# Patient Record
Sex: FEMALE | Race: WHITE | Employment: PART TIME | URBAN - METROPOLITAN AREA
[De-identification: names, ages, dates, MRNs, and addresses within clinical notes are randomized per-mention and may not be internally consistent; named-entity substitution may affect disease eponyms.]

---

## 2017-02-17 DIAGNOSIS — M25.511 ACUTE PAIN OF RIGHT SHOULDER: Primary | ICD-10-CM

## 2017-03-29 ENCOUNTER — OFFICE VISIT (OUTPATIENT)
Dept: URGENT CARE | Facility: RETAIL CLINIC | Age: 42
End: 2017-03-29
Payer: COMMERCIAL

## 2017-03-29 VITALS
WEIGHT: 244.8 LBS | DIASTOLIC BLOOD PRESSURE: 86 MMHG | TEMPERATURE: 100.1 F | OXYGEN SATURATION: 99 % | BODY MASS INDEX: 46.25 KG/M2 | HEART RATE: 90 BPM | SYSTOLIC BLOOD PRESSURE: 135 MMHG

## 2017-03-29 DIAGNOSIS — R05.9 COUGH: Primary | ICD-10-CM

## 2017-03-29 PROCEDURE — 99212 OFFICE O/P EST SF 10 MIN: CPT | Performed by: INTERNAL MEDICINE

## 2017-03-29 RX ORDER — AZITHROMYCIN 250 MG/1
TABLET, FILM COATED ORAL
Qty: 6 TABLET | Refills: 0 | Status: SHIPPED | OUTPATIENT
Start: 2017-03-29 | End: 2017-05-05

## 2017-03-29 NOTE — MR AVS SNAPSHOT
"              After Visit Summary   3/29/2017    Taylor Rey    MRN: [id number]           Patient Information     Date Of Birth          1975        Visit Information        Provider Department      3/29/2017 3:20 PM Hansa Alejandra MD [facility]        Today's Diagnoses     Cough    -  1       Follow-ups after your visit        Who to contact     You can reach your care team any time of the day by calling [phone].  Notification of test results:  If you have an abnormal lab result, we will notify you by phone as soon as possible.         Additional Information About Your Visit        MyChart Information     Minilogs lets you send messages to your doctor, view your test results, renew your prescriptions, schedule appointments and more. To sign up, go to [email or web address] . Click on \"Log in\" on the left side of the screen, which will take you to the Welcome page. Then click on \"Sign up Now\" on the right side of the page.     You will be asked to enter the access code listed below, as well as some personal information. Please follow the directions to create your username and password.     Your access code is: [email or web address]  Expires: 2017  3:55 PM     Your access code will  in 90 days. If you need help or a new code, please call your [place] clinic or [phone].        Care EveryWhere ID     This is your [facility] EveryWhere ID. This could be used by other organizations to access your [place] medical records  [id number]        Your Vitals Were     Pulse Temperature Pulse Oximetry BMI (Body Mass Index)          90 100.1  F (37.8  C) (Tympanic) 99% 46.25 kg/m2         Blood Pressure from Last 3 Encounters:   17 135/86   16 128/88   16 139/76    Weight from Last 3 Encounters:   17 244 lb 12.8 oz (111 kg)   16 234 lb (106.1 kg)   16 232 lb (105.2 kg)              Today, you had the following     No orders found for display         Today's " Medication Changes          These changes are accurate as of: 3/29/17  3:55 PM.  If you have any questions, ask your nurse or doctor.               Start taking these medicines.        Dose/Directions    azithromycin 250 MG tablet   Commonly known as:  ZITHROMAX   Used for:  Cough   Started by:  Hansa Alejandra MD        Two tablets first day, then one tablet daily for four days.   Quantity:  6 tablet   Refills:  0            Where to get your medicines      These medications were sent to [place], [place] - [address]   [address] [facility] [address]     Phone:  [phone]     azithromycin 250 MG tablet                Primary Care Provider Office Phone # Fax #    Cydney Mccray PA-C [phone]       [address] [place] [address]        Thank you!     Thank you for choosing [facility]  for your care. Our goal is always to provide you with excellent care. Hearing back from our patients is one way we can continue to improve our services. Please take a few minutes to complete the written survey that you may receive in the mail after your visit with us. Thank you!             Your Updated Medication List - Protect others around you: Learn how to safely use, store and throw away your medicines at www.disposemymeds.org.          This list is accurate as of: 3/29/17  3:55 PM.  Always use your most recent med list.                   Brand Name Dispense Instructions for use    albuterol 108 (90 BASE) MCG/ACT Inhaler    albuterol    1 Inhaler    Inhale 2 puffs into the lungs every 4 hours as needed for shortness of breath / dyspnea       azithromycin 250 MG tablet    ZITHROMAX    6 tablet    Two tablets first day, then one tablet daily for four days.       fluticasone 50 MCG/ACT spray    FLONASE    16 g    Spray 1-2 sprays into both nostrils daily       losartan 25 MG tablet    COZAAR    30 tablet    Take 1 tablet (25 mg) by  mouth daily

## 2017-03-29 NOTE — NURSING NOTE
"Chief Complaint   Patient presents with     Cough     11 days. dry cough. not getting much sleep at night       Initial /86 (BP Location: Right arm, Patient Position: Chair, Cuff Size: Adult Large)  Pulse 90  Temp 100.1  F (37.8  C) (Tympanic)  Wt 244 lb 12.8 oz (111 kg)  SpO2 99%  BMI 46.25 kg/m2 Estimated body mass index is 46.25 kg/(m^2) as calculated from the following:    Height as of 7/18/16: 5' 1\" (1.549 m).    Weight as of this encounter: 244 lb 12.8 oz (111 kg).  Medication Reconciliation: complete   Annamarie Suresh CMA (AAMA)      "

## 2017-03-29 NOTE — PROGRESS NOTES
[facility] Care Progress Note        Hansa Alejandra MD, MPH  03/29/2017        History:      Taylor Rey is a pleasant 42 year old year old female with a chief complaint of a non-productive cough x 12 days  No dyspnea or chest pain.   No smoking history.   No headache or neck pain.  No GI or  symptoms.   No MSK symptoms.         Assessment and Plan:        Acute bronchitis:  - azithromycin (ZITHROMAX) 250 MG tablet; Two tablets first day, then one tablet daily for four days.  Dispense: 6 tablet; Refill: 0  Discussed supportive care with the patient  Advised to increase fluid intake and rest.  Tylenol for pain q 6 hours prn  F/u w PCP in 4-5 days, earlier if symptoms worsen.                   Physical Exam:      /86 (BP Location: Right arm, Patient Position: Chair, Cuff Size: Adult Large)  Pulse 90  Temp 100.1  F (37.8  C) (Tympanic)  Wt 244 lb 12.8 oz (111 kg)  SpO2 99%  BMI 46.25 kg/m2     Constitutional: Patient is in no distress The patient is pleasant and cooperative.   HEENT: Head:  Head is atraumatic, normocephalic.    Eyes: Pupils are equal, round and reactive to light and accomodation.  Sclera is non-icteric. No conjunctival injection, or exudate noted. Extraocular motion is intact. Visual acuity is intact bilaterally.  Ears:  External acoustic canals are patent and clear.  There is no erythema and bulging( exudate)  of the ( R/L ) tympanic membrane(s ).   Nose:  No Nasal congestion w/o drainage or mucosal ulceration is noted.  Throat:  Oral mucosa is moist.  No oral lesions are noted.  No posterior pharyngeal hyperemia nor exudate noted.     Neck Supple.  There is no cervical lymphadenopathy.  No nuchal rigidity noted.  There is no meningismus.     Cardiovascular: Heart is regular to rate and rhythm.  No murmur is noted.     Lungs: Clear in the anterior and posterior pulmonary fields. No respiratory distress.   Abdomen: Soft and non-tender.    Back No flank tenderness is  noted.   Extremeties No edema, no calf tenderness.   Neuro: No focal deficit.   Skin No petechiae or purpura is noted.  There is no rash.   Mood Normal              Data:      All new lab and imaging data was reviewed.   Results for orders placed or performed in visit on 07/18/16   PAP IMAGED THIN LAYER SCREEN   Result Value Ref Range    PAP NIL     Copath Report         Patient Name: LINN HAY  MR#: [id number]  Specimen #: [id number]  Collected: 7/18/2016  Received: 7/19/2016  Reported: 7/20/2016 13:52  Ordering Phy(s): MEMO LOO    SPECIMEN/STAIN PROCESS:  Pap imaged thin layer prep screening (Surepath, FocalPoint with guided  screening)       Pap-Cyto x 1, Reflex HPV if NIL/ASCUS/LSIL x 1    SOURCE: Cervical, endocervical  ----------------------------------------------------------------   Pap imaged thin layer prep screening (Surepath, FocalPoint with guided  screening)  SPECIMEN ADEQUACY:  Satisfactory for evaluation.  -Transformation zone component absent.    CYTOLOGIC INTERPRETATION:    Negative for Intraepithelial Lesion or Malignancy    Electronically signed out by:  LATOYA Hernandez (ASCP)    Processed and screened at [facility]    CLINICAL HISTORY:  LMP: 7/11/16    Papanicolaou Test Limitations:  Cervical cytology is a screening test  with limited sensitivity; regular screenin g is critical for cancer  prevention; Pap tests are primarily effective for the  diagnosis/prevention of squamous cell carcinoma, not adenocarcinomas or  other cancers.    TESTING LAB LOCATION:  [address]  [phone]    COLLECTION SITE:  Client:  [facility]  Location: [facility] (P)     Basic metabolic panel  (Ca, Cl, CO2, Creat, Gluc, K, Na, BUN)   Result Value Ref Range    Sodium 140 133 - 144 mmol/L    Potassium 3.9 3.4 - 5.3 mmol/L    Chloride 104 94 - 109 mmol/L    Carbon Dioxide 25  20 - 32 mmol/L    Anion Gap 11 3 - 14 mmol/L    Glucose 91 70 - 99 mg/dL    Urea Nitrogen 18 7 - 30 mg/dL    Creatinine 0.66 0.52 - 1.04 mg/dL    GFR Estimate >90  Non  GFR Calc   >60 mL/min/1.7m2    GFR Estimate If Black >90   GFR Calc   >60 mL/min/1.7m2    Calcium 8.7 8.5 - 10.1 mg/dL   TSH with free T4 reflex   Result Value Ref Range    TSH 2.11 0.40 - 4.00 mU/L   HPV High Risk Types DNA Cervical   Result Value Ref Range    HPV 16 DNA Negative NEG    HPV 18 DNA Negative NEG    Other HR HPV Negative NEG    Final Diagnosis       This patient's sample is negative for HPV DNA.   The American College of   Obstetricians and Gynecologists (ACOG) recommends any woman between 30-65 years   old who receives negative test results on both Pap cytology screening and HPV   DNA testing should be rescreened in 5 years.  (Note)  METHODOLOGY:  The Roche alexandro 4800 system uses automated extraction,  simultaneous amplification of HPV (L1 region) and beta-globin,  followed by  real time detection of fluorescent labeled HPV and beta  globin using specific oligonucleotide probes . The test specifically  identifies types HPV 16 DNA and HPV 18 DNA while concurrently  detecting the rest of the high risk types (31, 33, 35, 39, 45, 51,  52, 56, 58, 59, 66 or 68).    COMMENTS:  This test is not intended for use as a screening device  for women under age 30 with normal cervical cytology.  Results should  be correlated with cytologic and histologic findings. Close clinical  followup is recommended.    This test was developed and its perfo rmance characteristics  determined by the [facility]  [facility]. It has not been cleared or approved by the  FDA. The laboratory is regulated under CLIA as qualified to perform  high-complexity testing. This test is used for clinical purposes. It  should not be regarded as investigational or for research.        Specimen  Description Cervical Cells  [facility] [address]

## 2017-05-05 ENCOUNTER — OFFICE VISIT (OUTPATIENT)
Dept: FAMILY MEDICINE | Facility: OTHER | Age: 42
End: 2017-05-05
Payer: COMMERCIAL

## 2017-05-05 VITALS
BODY MASS INDEX: 45.69 KG/M2 | OXYGEN SATURATION: 98 % | DIASTOLIC BLOOD PRESSURE: 76 MMHG | HEART RATE: 105 BPM | RESPIRATION RATE: 20 BRPM | WEIGHT: 242 LBS | HEIGHT: 61 IN | SYSTOLIC BLOOD PRESSURE: 110 MMHG | TEMPERATURE: 98.6 F

## 2017-05-05 DIAGNOSIS — I10 BENIGN ESSENTIAL HYPERTENSION: ICD-10-CM

## 2017-05-05 DIAGNOSIS — E66.01 MORBID OBESITY, UNSPECIFIED OBESITY TYPE (H): Primary | ICD-10-CM

## 2017-05-05 PROCEDURE — 99213 OFFICE O/P EST LOW 20 MIN: CPT | Performed by: NURSE PRACTITIONER

## 2017-05-05 RX ORDER — LOSARTAN POTASSIUM 25 MG/1
25 TABLET ORAL DAILY
Qty: 90 TABLET | Refills: 3 | Status: SHIPPED | OUTPATIENT
Start: 2017-05-05 | End: 2018-06-20

## 2017-05-05 NOTE — NURSING NOTE
"Chief Complaint   Patient presents with     Recheck Medication     Losartan       Initial /76  Pulse 105  Temp 98.6  F (37  C) (Tympanic)  Resp 20  Ht 5' 1\" (1.549 m)  Wt 242 lb (109.8 kg)  LMP 04/29/2017  SpO2 98%  Breastfeeding? No  BMI 45.73 kg/m2 Estimated body mass index is 45.73 kg/(m^2) as calculated from the following:    Height as of this encounter: 5' 1\" (1.549 m).    Weight as of this encounter: 242 lb (109.8 kg).  Medication Reconciliation: complete   ................Hua Bowen LPN,   May 5, 2017,      2:31 PM,   [facility]    "

## 2017-05-05 NOTE — MR AVS SNAPSHOT
"              After Visit Summary   2017    Taylor Rey    MRN: [id number]           Patient Information     Date Of Birth          1975        Visit Information        Provider Department      2017 2:20 PM Ramya Dejesus APRN CNP [facility]        Today's Diagnoses     Benign essential hypertension          Care Instructions    Continue the Losartan as prescribed.     Follow up this summer for a physical and fasting labs.                 Follow-ups after your visit        Who to contact     If you have questions or need follow up information about today's clinic visit or your schedule please contact [facility] directly at [phone].  Normal or non-critical lab and imaging results will be communicated to you by Wattpadhart, letter or phone within 4 business days after the clinic has received the results. If you do not hear from us within 7 days, please contact the clinic through Wattpadhart or phone. If you have a critical or abnormal lab result, we will notify you by phone as soon as possible.  Submit refill requests through [employer] or call your pharmacy and they will forward the refill request to us. Please allow 3 business days for your refill to be completed.          Additional Information About Your Visit        MyChart Information     [employer] lets you send messages to your doctor, view your test results, renew your prescriptions, schedule appointments and more. To sign up, go to [email or web address] . Click on \"Log in\" on the left side of the screen, which will take you to the Welcome page. Then click on \"Sign up Now\" on the right side of the page.     You will be asked to enter the access code listed below, as well as some personal information. Please follow the directions to create your username and password.     Your access code is: [email or web address]  Expires: 2017  3:55 PM     Your access code will  in 90 days. If you need help or a new code, please call your " "[facility] or [phone].        Care EveryWhere ID     This is your Care EveryWhere ID. This could be used by other organizations to access your [place] medical records  [id number]        Your Vitals Were     Pulse Temperature Respirations Height Last Period Pulse Oximetry    105 98.6  F (37  C) (Tympanic) 20 5' 1\" (1.549 m) 04/29/2017 98%    Breastfeeding? BMI (Body Mass Index)                No 45.73 kg/m2           Blood Pressure from Last 3 Encounters:   05/05/17 110/76   03/29/17 135/86   12/22/16 128/88    Weight from Last 3 Encounters:   05/05/17 242 lb (109.8 kg)   03/29/17 244 lb 12.8 oz (111 kg)   07/18/16 234 lb (106.1 kg)              Today, you had the following     No orders found for display         Where to get your medicines      These medications were sent to [facility] [address]   [address] [facility] [address]     Phone:  [phone]     losartan 25 MG tablet          Primary Care Provider Office Phone # Fax #    Cydney Mccray PA-C [phone]       [address] [place] [address]        Thank you!     Thank you for choosing [facility]  for your care. Our goal is always to provide you with excellent care. Hearing back from our patients is one way we can continue to improve our services. Please take a few minutes to complete the written survey that you may receive in the mail after your visit with us. Thank you!             Your Updated Medication List - Protect others around you: Learn how to safely use, store and throw away your medicines at www.disposemymeds.org.          This list is accurate as of: 5/5/17  2:44 PM.  Always use your most recent med list.                   Brand Name Dispense Instructions for use    losartan 25 MG tablet    COZAAR    90 tablet    Take 1 tablet (25 mg) by mouth daily         "

## 2017-05-05 NOTE — PROGRESS NOTES
"  SUBJECTIVE:                                                    Taylor Rey is a 42 year old female who presents to clinic today for the following health issues:      Hypertension Follow-up      Outpatient blood pressures are being checked at [facility].  Results are NL.    Low Salt Diet: low salt       Amount of exercise or physical activity: 10,000 steps daily    Problems taking medications regularly: No    Medication side effects: none    Diet: regular (no restrictions)        Problem list and histories reviewed & adjusted, as indicated.  Additional history: none    Patient Active Problem List   Diagnosis     Elevated blood pressure     Benign essential hypertension     Morbid obesity (H)     Past Surgical History:   Procedure Laterality Date     C  DELIVERY ONLY  2/10/2004    , Low Cervical     C FULL ROUT OBSTE CARE, DELIV      pt. had toxemia/fetal distress related to amniotic fluid     C LIGATION,FALLOPIAN TUBE W/  2/10/2004     CHOLECYSTECTOMY, LAPOROSCOPIC  2006    Cholecystectomy, Laparoscopic     HC REMOVE TONSILS/ADENOIDS,12+ Y/O         Social History   Substance Use Topics     Smoking status: Former Smoker     Quit date: 2005     Smokeless tobacco: Never Used      Comment: one pack a month     Alcohol use 0.0 oz/week     0 Standard drinks or equivalent per week      Comment: very rare     Family History   Problem Relation Age of Onset     CEREBROVASCULAR DISEASE Mother       from stroke/clotting \"vit. C deficiency\"     Arthritis Father      HEART DISEASE Father      CEREBROVASCULAR DISEASE Maternal Grandmother           \"     DIABETES Paternal Grandmother      Breast Cancer Paternal Grandmother      Arthritis Paternal Grandmother          Current Outpatient Prescriptions   Medication Sig Dispense Refill     losartan (COZAAR) 25 MG tablet Take 1 tablet (25 mg) by mouth daily 90 tablet 3     [DISCONTINUED] losartan (COZAAR) 25 MG tablet Take 1 " "tablet (25 mg) by mouth daily 30 tablet 3     Allergies   Allergen Reactions     Lisinopril Cough     Sulfa Drugs      Azo [Phenazopyridine] Rash     BP Readings from Last 3 Encounters:   05/05/17 110/76   03/29/17 135/86   12/22/16 128/88    Wt Readings from Last 3 Encounters:   05/05/17 242 lb (109.8 kg)   03/29/17 244 lb 12.8 oz (111 kg)   07/18/16 234 lb (106.1 kg)                    Reviewed and updated as needed this visit by clinical staff  Tobacco  Allergies  Meds  Soc Hx      Reviewed and updated as needed this visit by Provider         ROS:  C: NEGATIVE for fever, chills, change in weight  E/M: NEGATIVE for ear, mouth and throat problems  R: NEGATIVE for significant cough or SOB  CV: NEGATIVE for chest pain, palpitations or peripheral edema    OBJECTIVE:                                                    /76  Pulse 105  Temp 98.6  F (37  C) (Tympanic)  Resp 20  Ht 5' 1\" (1.549 m)  Wt 242 lb (109.8 kg)  LMP 04/29/2017  SpO2 98%  Breastfeeding? No  BMI 45.73 kg/m2  Body mass index is 45.73 kg/(m^2).  GENERAL: alert, no distress and obese  NECK: no adenopathy, no asymmetry, masses, or scars and thyroid normal to palpation  RESP: lungs clear to auscultation - no rales, rhonchi or wheezes  CV: regular rate and rhythm, normal S1 S2, no S3 or S4, no murmur, click or rub, no peripheral edema and peripheral pulses strong  MS: no gross musculoskeletal defects noted, no edema    Diagnostic Test Results:  none      ASSESSMENT/PLAN:                                                    Hypertension; controlled   Associated with the following complications:    None   Plan:  No changes in the patient's current treatment plan  - losartan (COZAAR) 25 MG tablet; Take 1 tablet (25 mg) by mouth daily  Dispense: 90 tablet; Refill: 3    FUTURE APPOINTMENTS:       - Follow up in 3 months for physical with fasting labs.   See Patient Instructions    PAOLA Moe [facility]    "

## 2017-05-05 NOTE — PATIENT INSTRUCTIONS
Continue the Losartan as prescribed.     Follow up this summer for a physical and fasting labs.

## 2017-06-26 ENCOUNTER — OFFICE VISIT (OUTPATIENT)
Dept: FAMILY MEDICINE | Facility: OTHER | Age: 42
End: 2017-06-26
Payer: COMMERCIAL

## 2017-06-26 VITALS
DIASTOLIC BLOOD PRESSURE: 80 MMHG | HEART RATE: 108 BPM | RESPIRATION RATE: 20 BRPM | TEMPERATURE: 98.1 F | OXYGEN SATURATION: 98 % | SYSTOLIC BLOOD PRESSURE: 122 MMHG | BODY MASS INDEX: 46.29 KG/M2 | WEIGHT: 245 LBS

## 2017-06-26 DIAGNOSIS — B07.8 COMMON WART: ICD-10-CM

## 2017-06-26 DIAGNOSIS — M25.50 MULTIPLE JOINT PAIN: Primary | ICD-10-CM

## 2017-06-26 PROCEDURE — 86431 RHEUMATOID FACTOR QUANT: CPT | Performed by: NURSE PRACTITIONER

## 2017-06-26 PROCEDURE — 86618 LYME DISEASE ANTIBODY: CPT | Performed by: NURSE PRACTITIONER

## 2017-06-26 PROCEDURE — 86140 C-REACTIVE PROTEIN: CPT | Performed by: NURSE PRACTITIONER

## 2017-06-26 PROCEDURE — 86038 ANTINUCLEAR ANTIBODIES: CPT | Performed by: NURSE PRACTITIONER

## 2017-06-26 PROCEDURE — 99214 OFFICE O/P EST MOD 30 MIN: CPT | Mod: 25 | Performed by: NURSE PRACTITIONER

## 2017-06-26 PROCEDURE — 17110 DESTRUCTION B9 LES UP TO 14: CPT | Performed by: NURSE PRACTITIONER

## 2017-06-26 PROCEDURE — 36415 COLL VENOUS BLD VENIPUNCTURE: CPT | Performed by: NURSE PRACTITIONER

## 2017-06-26 NOTE — PATIENT INSTRUCTIONS
Labs will be done today.  I will call or send a letter with results within the next 1-2 weeks.      Return in 3-4 weeks to have another wart treatment if needed.

## 2017-06-26 NOTE — NURSING NOTE
"Chief Complaint   Patient presents with     Arthritis     wants testing       Initial /80  Pulse 108  Temp 98.1  F (36.7  C) (Tympanic)  Resp 20  Wt 245 lb (111.1 kg)  LMP 06/20/2017  SpO2 98%  Breastfeeding? No  BMI 46.29 kg/m2 Estimated body mass index is 46.29 kg/(m^2) as calculated from the following:    Height as of 5/5/17: 5' 1\" (1.549 m).    Weight as of this encounter: 245 lb (111.1 kg).  Medication Reconciliation: complete   ................Hua Bowen LPN,   June 26, 2017,      3:28 PM,   [facility]     "

## 2017-06-26 NOTE — MR AVS SNAPSHOT
"              After Visit Summary   6/26/2017    Taylor Rey    MRN: [id number]           Patient Information     Date Of Birth          1975        Visit Information        Provider Department      6/26/2017 3:20 PM Ramya Dejesus APRN CNP [facility]        Today's Diagnoses     Multiple joint pain    -  1      Care Instructions    Labs will be done today.  I will call or send a letter with results within the next 1-2 weeks.      Return in 3-4 weeks to have another wart treatment if needed.                   Follow-ups after your visit        Who to contact     If you have questions or need follow up information about today's clinic visit or your schedule please contact [facility] directly at [phone].  Normal or non-critical lab and imaging results will be communicated to you by MyChart, letter or phone within 4 business days after the clinic has received the results. If you do not hear from us within 7 days, please contact the clinic through [employer] or phone. If you have a critical or abnormal lab result, we will notify you by phone as soon as possible.  Submit refill requests through [employer] or call your pharmacy and they will forward the refill request to us. Please allow 3 business days for your refill to be completed.          Additional Information About Your Visit        MyChart Information     [employer] lets you send messages to your doctor, view your test results, renew your prescriptions, schedule appointments and more. To sign up, go to [email or web address] . Click on \"Log in\" on the left side of the screen, which will take you to the Welcome page. Then click on \"Sign up Now\" on the right side of the page.     You will be asked to enter the access code listed below, as well as some personal information. Please follow the directions to create your username and password.     Your access code is: [email or web address]  Expires: 6/27/2017  3:55 PM     Your access code will "  in 90 days. If you need help or a new code, please call your [place] clinic or [phone].        Care EveryWhere ID     This is your Care EveryWhere ID. This could be used by other organizations to access your [place] medical records  [id number]        Your Vitals Were     Pulse Temperature Respirations Last Period Pulse Oximetry Breastfeeding?    108 98.1  F (36.7  C) (Tympanic) 20 2017 98% No    BMI (Body Mass Index)                   46.29 kg/m2            Blood Pressure from Last 3 Encounters:   17 122/80   17 110/76   17 135/86    Weight from Last 3 Encounters:   17 245 lb (111.1 kg)   17 242 lb (109.8 kg)   17 244 lb 12.8 oz (111 kg)              We Performed the Following     Antinuclear antibody screen by EIA     CRP, inflammation     Lyme Disease Phoebe with reflex to WB Serum     Rheumatoid factor        Primary Care Provider Office Phone # Fax #    Cydney Mccray PA-C [phone]       [address] [place] [address]        Equal Access to Services     LAURIE PALACIOS AH: Hadii aad ku hadasho Soomaali, waaxda luqadaha, qaybta kaalmada adeegyada, waxay idiin hayaan sage hurleyaramartínez reynoso ah. So [facility] [phone].    ATENCIÓN: Si habla español, tiene a holbrook disposición servicios gratuitos de asistencia lingüística. Llame al [phone].    We comply with applicable federal civil rights laws and [place] laws. We do not discriminate on the basis of race, color, national origin, age, disability sex, sexual orientation or gender identity.            Thank you!     Thank you for choosing [facility]  for your care. Our goal is always to provide you with excellent care. Hearing back from our patients is one way we can continue to improve our services. Please take a few minutes to complete the written survey that you may receive in the mail after your visit with us. Thank you!             Your Updated  Medication List - Protect others around you: Learn how to safely use, store and throw away your medicines at www.disposemymeds.org.          This list is accurate as of: 6/26/17  3:48 PM.  Always use your most recent med list.                   Brand Name Dispense Instructions for use Diagnosis    losartan 25 MG tablet    COZAAR    90 tablet    Take 1 tablet (25 mg) by mouth daily    Benign essential hypertension

## 2017-06-26 NOTE — PROGRESS NOTES
"  SUBJECTIVE:                                                    Taylor Rey is a 42 year old female who presents to clinic today for the following health issues:      Musculoskeletal problem/pain      Duration: many years    Description  Location: right shoulder, left hip, ankles, knees    Intensity:  mild    Accompanying signs and symptoms: swelling    History  Previous similar problem: no   Previous evaluation:  none    Precipitating or alleviating factors:  Trauma or overuse: no   Aggravating factors include: standing    Therapies tried: aleve, brace on ankle, naproxen; helps just slightly      Also wants warts frozen today.  Has tried over the counter treatments and they did not improve.     Problem list and histories reviewed & adjusted, as indicated.  Additional history: none    Patient Active Problem List   Diagnosis     Elevated blood pressure     Benign essential hypertension     Morbid obesity (H)     Past Surgical History:   Procedure Laterality Date     C  DELIVERY ONLY  2/10/2004    , Low Cervical     C FULL ROUT OBSTE CARE, DELIV      pt. had toxemia/fetal distress related to amniotic fluid     C LIGATION,FALLOPIAN TUBE W/  2/10/2004     CHOLECYSTECTOMY, LAPOROSCOPIC  2006    Cholecystectomy, Laparoscopic     HC REMOVE TONSILS/ADENOIDS,12+ Y/O         Social History   Substance Use Topics     Smoking status: Former Smoker     Quit date: 2005     Smokeless tobacco: Never Used      Comment: one pack a month     Alcohol use 0.0 oz/week     0 Standard drinks or equivalent per week      Comment: very rare     Family History   Problem Relation Age of Onset     CEREBROVASCULAR DISEASE Mother       from stroke/clotting \"vit. C deficiency\"     Arthritis Father      HEART DISEASE Father      CEREBROVASCULAR DISEASE Maternal Grandmother           \"     DIABETES Paternal Grandmother      Breast Cancer Paternal Grandmother      Arthritis Paternal Grandmother     "      Current Outpatient Prescriptions   Medication Sig Dispense Refill     losartan (COZAAR) 25 MG tablet Take 1 tablet (25 mg) by mouth daily 90 tablet 3     Allergies   Allergen Reactions     Lisinopril Cough     Sulfa Drugs      Azo [Phenazopyridine] Rash     BP Readings from Last 3 Encounters:   06/26/17 122/80   05/05/17 110/76   03/29/17 135/86    Wt Readings from Last 3 Encounters:   06/26/17 245 lb (111.1 kg)   05/05/17 242 lb (109.8 kg)   03/29/17 244 lb 12.8 oz (111 kg)                    Reviewed and updated as needed this visit by clinical staff  Tobacco  Allergies  Meds  Med Hx  Surg Hx  Fam Hx  Soc Hx      Reviewed and updated as needed this visit by Provider         ROS:  C: NEGATIVE for fever, chills, change in weight  INTEGUMENTARY/SKIN: NEGATIVE for worrisome rashes, moles or lesions  E/M: NEGATIVE for ear, mouth and throat problems  R: NEGATIVE for significant cough or SOB  CV: NEGATIVE for chest pain, palpitations or peripheral edema  GI: NEGATIVE for nausea, abdominal pain, heartburn, or change in bowel habits  : normal menstrual cycles  MUSCULOSKELETAL: as above     OBJECTIVE:     /80  Pulse 108  Temp 98.1  F (36.7  C) (Tympanic)  Resp 20  Wt 245 lb (111.1 kg)  LMP 06/20/2017  SpO2 98%  Breastfeeding? No  BMI 46.29 kg/m2  Body mass index is 46.29 kg/(m^2).  GENERAL: alert, no distress and obese  NECK: no adenopathy, no asymmetry, masses, or scars and thyroid normal to palpation  RESP: lungs clear to auscultation - no rales, rhonchi or wheezes  CV: regular rate and rhythm, normal S1 S2, no S3 or S4, no murmur, click or rub, no peripheral edema and peripheral pulses strong  MS: no gross musculoskeletal defects noted, trace right ankle edema.  Mild tenderness to palpation there.  No other joint swelling, erythema, or tenderness.  Walking normally.   SKIN: no suspicious lesions or rashes.  Has two warts - one on left 3rd digit and on on right top of foot.     Diagnostic Test  Results:  Pending      PROCEDURE: CRYOTHERAPY:   Discussed treatment options for warts including OTC treatments, cryotherapy and surgical removal.  Patient elects cryotherapy.  All lesions are frozen with LN2 x 2 freeze/thaw cycles. Patient tolerated procedure well.       ASSESSMENT/PLAN:       1. Multiple joint pain  Will check labs.  She also has some random point tenderness in her upper back, may be fibromyalgia.  - Lyme Disease Phoebe with reflex to WB Serum  - Antinuclear antibody screen by EIA  - Rheumatoid factor  - CRP, inflammation    2. Common wart  Return in 3-4 weeks for another treatment as needed.   - DESTRUCT BENIGN LESION, UP TO 14    See Patient Instructions    PAOLA Moe CNP  [facility]

## 2017-06-27 LAB — CRP SERPL-MCNC: 5.3 MG/L (ref 0–8)

## 2017-06-28 LAB
ANA SER QL IA: NORMAL
B BURGDOR IGG+IGM SER QL: 0.01 (ref 0–0.89)
RHEUMATOID FACT SER NEPH-ACNC: <20 IU/ML (ref 0–20)

## 2017-06-29 DIAGNOSIS — M25.50 MULTIPLE JOINT PAIN: Primary | ICD-10-CM

## 2017-06-29 RX ORDER — CELECOXIB 100 MG/1
100 CAPSULE ORAL 2 TIMES DAILY PRN
Qty: 60 CAPSULE | Refills: 1 | Status: SHIPPED | OUTPATIENT
Start: 2017-06-29 | End: 2018-06-20

## 2017-06-30 ENCOUNTER — TELEPHONE (OUTPATIENT)
Dept: FAMILY MEDICINE | Facility: CLINIC | Age: 42
End: 2017-06-30

## 2017-06-30 NOTE — TELEPHONE ENCOUNTER
Reason for call:  Patient reporting a symptom    Symptom or request: possible infected wart    Duration (how long have symptoms been present): today    Have you been treated for this before? No    Additional comments: Taylor had a wart frozen off on 6-26-17, she thinks it might be infected and would like to speak to a nurse to find out if she should be seen    Phone Number patient can be reached at:  Home number on file [phone] (home)    Best Time:      Can we leave a detailed message on this number:  YES    Call taken on 6/30/2017 at 3:24 PM by Renetta Dacosta

## 2017-07-01 ENCOUNTER — OFFICE VISIT (OUTPATIENT)
Dept: URGENT CARE | Facility: RETAIL CLINIC | Age: 42
End: 2017-07-01
Payer: COMMERCIAL

## 2017-07-01 VITALS
HEART RATE: 79 BPM | DIASTOLIC BLOOD PRESSURE: 78 MMHG | BODY MASS INDEX: 46.29 KG/M2 | TEMPERATURE: 98.5 F | RESPIRATION RATE: 18 BRPM | SYSTOLIC BLOOD PRESSURE: 117 MMHG | OXYGEN SATURATION: 100 % | WEIGHT: 245 LBS

## 2017-07-01 DIAGNOSIS — R21 RASH: Primary | ICD-10-CM

## 2017-07-01 PROCEDURE — 99213 OFFICE O/P EST LOW 20 MIN: CPT | Performed by: INTERNAL MEDICINE

## 2017-07-01 RX ORDER — DOXYCYCLINE 100 MG/1
100 CAPSULE ORAL 2 TIMES DAILY
Qty: 20 CAPSULE | Refills: 0 | Status: SHIPPED | OUTPATIENT
Start: 2017-07-01 | End: 2018-01-27

## 2017-07-01 RX ORDER — MUPIROCIN CALCIUM 20 MG/G
CREAM TOPICAL 3 TIMES DAILY
Qty: 22 G | Refills: 0 | Status: SHIPPED | OUTPATIENT
Start: 2017-07-01 | End: 2018-06-20

## 2017-07-01 NOTE — MR AVS SNAPSHOT
"              After Visit Summary   2017    Taylor Rey    MRN: [id number]           Patient Information     Date Of Birth          1975        Visit Information        Provider Department      2017 11:00 AM Hansa Alejandra MD [facility]        Today's Diagnoses     Rash    -  1       Follow-ups after your visit        Who to contact     You can reach your care team any time of the day by calling [phone].  Notification of test results:  If you have an abnormal lab result, we will notify you by phone as soon as possible.         Additional Information About Your Visit        MyChart Information     CloudCar lets you send messages to your doctor, view your test results, renew your prescriptions, schedule appointments and more. To sign up, go to [email or web address] . Click on \"Log in\" on the left side of the screen, which will take you to the Welcome page. Then click on \"Sign up Now\" on the right side of the page.     You will be asked to enter the access code listed below, as well as some personal information. Please follow the directions to create your username and password.     Your access code is: [email or web address]  Expires: 2017 11:45 AM     Your access code will  in 90 days. If you need help or a new code, please call your [place] clinic or [phone].        Care EveryWhere ID     This is your Care EveryWhere ID. This could be used by other organizations to access your [place] medical records  [id number]        Your Vitals Were     Pulse Temperature Respirations Last Period Pulse Oximetry BMI (Body Mass Index)    79 98.5  F (36.9  C) (Tympanic) 18 2017 100% 46.29 kg/m2       Blood Pressure from Last 3 Encounters:   17 117/78   17 122/80   17 110/76    Weight from Last 3 Encounters:   17 245 lb (111.1 kg)   17 245 lb (111.1 kg)   17 242 lb (109.8 kg)              Today, you had the following     No orders found for " display         Today's Medication Changes          These changes are accurate as of: 7/1/17 11:45 AM.  If you have any questions, ask your nurse or doctor.               Start taking these medicines.        Dose/Directions    doxycycline 100 MG capsule   Commonly known as:  VIBRAMYCIN   Used for:  Rash   Started by:  Hansa Alejandra MD        Dose:  100 mg   Take 1 capsule (100 mg) by mouth 2 times daily   Quantity:  20 capsule   Refills:  0       mupirocin 2 % cream   Commonly known as:  BACTROBAN   Used for:  Rash   Started by:  Hansa Alejandra MD        Apply topically 3 times daily   Quantity:  22 g   Refills:  0            Where to get your medicines      These medications were sent to [facility] [place], [place] - [address]  [address] [facility] [address]     Phone:  [phone]     doxycycline 100 MG capsule    mupirocin 2 % cream                Primary Care Provider Office Phone # Fax #    Cydney MILKA Mccray PA-C [phone]       [address] [place] [address]        Equal Access to Services     [facility]: Hadii aad ku hadasho Soomaali, waaxda luqadaha, qaybta kaalmada adeegyada, waxay idiin hayaan sage khifeanyi reynoso . So [facility] [phone].    ATENCIÓN: Si habla español, tiene a holbrook disposición servicios gratuitos de asistencia lingüística. Llame al [phone].    We comply with applicable federal civil rights laws and [place] laws. We do not discriminate on the basis of race, color, national origin, age, disability sex, sexual orientation or gender identity.            Thank you!     Thank you for choosing [facility]  for your care. Our goal is always to provide you with excellent care. Hearing back from our patients is one way we can continue to improve our services. Please take a few minutes to complete the written survey that you may receive in the mail after your visit with us. Thank you!              Your Updated Medication List - Protect others around you: Learn how to safely use, store and throw away your medicines at www.disposemymeds.org.          This list is accurate as of: 7/1/17 11:45 AM.  Always use your most recent med list.                   Brand Name Dispense Instructions for use Diagnosis    celecoxib 100 MG capsule    celeBREX    60 capsule    Take 1 capsule (100 mg) by mouth 2 times daily as needed for moderate pain    Multiple joint pain       doxycycline 100 MG capsule    VIBRAMYCIN    20 capsule    Take 1 capsule (100 mg) by mouth 2 times daily    Rash       losartan 25 MG tablet    COZAAR    90 tablet    Take 1 tablet (25 mg) by mouth daily    Benign essential hypertension       mupirocin 2 % cream    BACTROBAN    22 g    Apply topically 3 times daily    Rash

## 2017-07-01 NOTE — NURSING NOTE
"Chief Complaint   Patient presents with     Derm Problem     Patient had wart froze of on right foot and left index finger on monday area is now swollen and painful       Initial /78 (BP Location: Right arm, Patient Position: Chair, Cuff Size: Adult Large)  Pulse 79  Temp 98.5  F (36.9  C) (Tympanic)  Resp 18  Wt 245 lb (111.1 kg)  LMP 06/20/2017  SpO2 100%  BMI 46.29 kg/m2 Estimated body mass index is 46.29 kg/(m^2) as calculated from the following:    Height as of 5/5/17: 5' 1\" (1.549 m).    Weight as of this encounter: 245 lb (111.1 kg).  Medication Reconciliation: complete     Jessica Sundet      "

## 2017-07-01 NOTE — PROGRESS NOTES
[facility] Care Progress Note        Hansa Alejandra MD, MPH  07/01/2017    Taylor Rey is a pleasant 42 year old female seen for a rash surrounding right dorsal foot where she had wart treatment 5 days ago. No calf tenderness is referred. No trauma. No drainage from the area of cryotherapy is referred. No fever or chills. .  There has not been any change in the diet or new detergent, soap or toiletries.  No new medications, no insect bite.  No fever or chills and no recent illness.  No cough or shortness of breath or wheezing is referred.  No nausea, vomiting or diarrhea.  No arthralgia or myalgia.  No tongue or lip swelling or swallowing    Physical Exam   /78 (BP Location: Right arm, Patient Position: Chair, Cuff Size: Adult Large)  Pulse 79  Temp 98.5  F (36.9  C) (Tympanic)  Resp 18  Wt 245 lb (111.1 kg)  LMP 06/20/2017  SpO2 100%  BMI 46.29 kg/m2     Constitutional: Patient is in no distress The patient is pleasant and cooperative.   HEENT: Head:  Head is atraumatic, normocephalic.    Eyes: Pupils are equal, round and reactive to light and accomodation.  Sclera is non-icteric. No conjunctival injection, or exudate noted. Extraocular motion is intact. Visual acuity is intact bilaterally.  Ears:  External acoustic canals are patent and clear.  There is no erythema and bulging( exudate)  of the ( R/L ) tympanic membrane(s ).   Nose:  No Nasal congestion w/o drainage or mucosal ulceration is noted.  Throat:  Oral mucosa is moist.  No oral lesions are noted.  No posterior pharyngeal hyperemia or exudate noted.     Neck Supple.  There is no cervical lymphadenopathy.  No nuchal rigidity noted.  There is no meningismus.     Cardiovascular: Heart is regular to rate and rhythm.  No murmur is noted.     Chest. Chest Symmetrical, no soft tissues, swelling, or tenderness upon palpation   Lungs: Clear in the anterior and posterior pulmonary fields.   Abdomen: Soft and non-tender.    Back No  flank tenderness is noted.   Extremeties No edema, no calf tenderness.   Neuro: No focal deficit.   Skin No petechiae or purpura is noted.  There is a circumferential area of erythema surrounding dorsal right foot area of wart cryotherapy w/o drainage. A superficial skin ulcer at the site of wart treatment is noted. No fluctuant mass or abscess is noted. No lymphangitis.   Mood Normal         Assessment & Plan     S/p cryotherapy of right dorsal foot with subsequent cellulitis:    - mupirocin (BACTROBAN) 2 % cream; Apply topically 3 times daily  Dispense: 22 g; Refill: 0  - doxycycline (VIBRAMYCIN) 100 MG capsule; Take 1 capsule (100 mg) by mouth 2 times daily  Dispense: 20 capsule; Refill: 0   Discussed the adverse effects of the medication. S/p hysterectomy.  Follow-up with your PCP in 2 days, earlier if symptoms worsen.  Advisd to go to ER if symptoms worsen.  Advised to maintain right foot clean by washing w soap and water and keep the area of wart treated skin covered with moist gauze pads.  Advised to keep right foot elevted.

## 2017-07-03 ENCOUNTER — OFFICE VISIT (OUTPATIENT)
Dept: FAMILY MEDICINE | Facility: OTHER | Age: 42
End: 2017-07-03
Payer: COMMERCIAL

## 2017-07-03 VITALS
TEMPERATURE: 97.2 F | SYSTOLIC BLOOD PRESSURE: 122 MMHG | WEIGHT: 245.7 LBS | HEART RATE: 72 BPM | DIASTOLIC BLOOD PRESSURE: 74 MMHG | RESPIRATION RATE: 20 BRPM | BODY MASS INDEX: 46.42 KG/M2

## 2017-07-03 DIAGNOSIS — L03.115 CELLULITIS OF RIGHT LOWER EXTREMITY: Primary | ICD-10-CM

## 2017-07-03 DIAGNOSIS — E66.01 MORBID OBESITY WITH BMI OF 45.0-49.9, ADULT (H): ICD-10-CM

## 2017-07-03 PROCEDURE — 99213 OFFICE O/P EST LOW 20 MIN: CPT | Performed by: PHYSICIAN ASSISTANT

## 2017-07-03 ASSESSMENT — PAIN SCALES - GENERAL: PAINLEVEL: MODERATE PAIN (4)

## 2017-07-03 NOTE — TELEPHONE ENCOUNTER
RN attempted to all pt to discuss her wart concern, but NA. Left message on identified phone to please call us back if she still has concerns. ...........RIA Guillory

## 2017-07-03 NOTE — MR AVS SNAPSHOT
"              After Visit Summary   7/3/2017    Taylor Rey    MRN: [id number]           Patient Information     Date Of Birth          1975        Visit Information        Provider Department      7/3/2017 4:20 PM Hilton Farris PA-C [facility]        Today's Diagnoses     Cellulitis of right lower extremity    -  1       Follow-ups after your visit        Who to contact     If you have questions or need follow up information about today's clinic visit or your schedule please contact [facility] directly at [phone].  Normal or non-critical lab and imaging results will be communicated to you by Fanaticallhart, letter or phone within 4 business days after the clinic has received the results. If you do not hear from us within 7 days, please contact the clinic through Fanaticallhart or phone. If you have a critical or abnormal lab result, we will notify you by phone as soon as possible.  Submit refill requests through Tractive or call your pharmacy and they will forward the refill request to us. Please allow 3 business days for your refill to be completed.          Additional Information About Your Visit        MyChart Information     Tractive lets you send messages to your doctor, view your test results, renew your prescriptions, schedule appointments and more. To sign up, go to [email or web address] . Click on \"Log in\" on the left side of the screen, which will take you to the Welcome page. Then click on \"Sign up Now\" on the right side of the page.     You will be asked to enter the access code listed below, as well as some personal information. Please follow the directions to create your username and password.     Your access code is: [email or web address]  Expires: 2017 11:45 AM     Your access code will  in 90 days. If you need help or a new code, please call your [facility] or [phone].        Care EveryWhere ID     This is your Care EveryWhere ID. This could be used by other " organizations to access your [place] medical records  [id number]        Your Vitals Were     Pulse Temperature Respirations Last Period BMI (Body Mass Index)       72 97.2  F (36.2  C) (Oral) 20 06/20/2017 46.42 kg/m2        Blood Pressure from Last 3 Encounters:   07/03/17 122/74   07/01/17 117/78   06/26/17 122/80    Weight from Last 3 Encounters:   07/03/17 245 lb 11.2 oz (111.4 kg)   07/01/17 245 lb (111.1 kg)   06/26/17 245 lb (111.1 kg)              Today, you had the following     No orders found for display       Primary Care Provider Office Phone # Fax #    Cydney Mccray PA-C [phone]       [address] [place] [address]        Equal Access to Services     LAURIE PALACIOS : Hadii aad ku hadasho Soomaali, waaxda luqadaha, qaybta kaalmada adeegyada, kleber reinoso haygenna reynoso . So [facility] [phone].    ATENCIÓN: Si habla español, tiene a holbrook disposición servicios gratuitos de asistencia lingüística. Yonataname al [phone].    We comply with applicable federal civil rights laws and [place] laws. We do not discriminate on the basis of race, color, national origin, age, disability sex, sexual orientation or gender identity.            Thank you!     Thank you for choosing [facility]  for your care. Our goal is always to provide you with excellent care. Hearing back from our patients is one way we can continue to improve our services. Please take a few minutes to complete the written survey that you may receive in the mail after your visit with us. Thank you!             Your Updated Medication List - Protect others around you: Learn how to safely use, store and throw away your medicines at www.disposemymeds.org.          This list is accurate as of: 7/3/17  4:25 PM.  Always use your most recent med list.                   Brand Name Dispense Instructions for use Diagnosis    celecoxib 100 MG capsule    celeBREX    60 capsule    Take  1 capsule (100 mg) by mouth 2 times daily as needed for moderate pain    Multiple joint pain       doxycycline 100 MG capsule    VIBRAMYCIN    20 capsule    Take 1 capsule (100 mg) by mouth 2 times daily    Rash       losartan 25 MG tablet    COZAAR    90 tablet    Take 1 tablet (25 mg) by mouth daily    Benign essential hypertension       mupirocin 2 % cream    BACTROBAN    22 g    Apply topically 3 times daily    Rash

## 2017-07-03 NOTE — NURSING NOTE
"Chief Complaint   Patient presents with     Wart     recheck warts after treatment       Initial /74 (BP Location: Right arm, Patient Position: Chair, Cuff Size: Adult Large)  Pulse 72  Temp 97.2  F (36.2  C) (Oral)  Resp 20  Wt 245 lb 11.2 oz (111.4 kg)  LMP 06/20/2017  BMI 46.42 kg/m2 Estimated body mass index is 46.42 kg/(m^2) as calculated from the following:    Height as of 5/5/17: 5' 1\" (1.549 m).    Weight as of this encounter: 245 lb 11.2 oz (111.4 kg).  Medication Reconciliation: complete       Thuy AGUILAR LPN    Health Maintenance reviewed at today's visit patient asked to schedule/complete:   Immunizations:  Patient declined    "

## 2017-07-03 NOTE — PROGRESS NOTES
"  SUBJECTIVE:                                                    Taylor Rey is a 42 year old female who presents to clinic today for the following health issues:      Concern - recheck after wart treatment  Onset: recheck    Description:   Cellulitis to the right foot which is resolving well.    Intensity: mild    Progression of Symptoms:  improving    Accompanying Signs & Symptoms:  Mild pain    Previous history of similar problem:   denies    Precipitating factors:   Worsened by: movement    Alleviating factors:  Improved by: medications.    Therapies Tried and outcome:   Problem list and histories reviewed & adjusted, as indicated.  Additional history: as documented    Patient Active Problem List   Diagnosis     Elevated blood pressure     Benign essential hypertension     Morbid obesity (H)     Past Surgical History:   Procedure Laterality Date     C  DELIVERY ONLY  2/10/2004    , Low Cervical     C FULL ROUT OBSTE CARE, DELIV      pt. had toxemia/fetal distress related to amniotic fluid     C LIGATION,FALLOPIAN TUBE W/  2/10/2004     CHOLECYSTECTOMY, LAPOROSCOPIC  2006    Cholecystectomy, Laparoscopic     HC REMOVE TONSILS/ADENOIDS,12+ Y/O         Social History   Substance Use Topics     Smoking status: Former Smoker     Quit date: 2005     Smokeless tobacco: Never Used      Comment: one pack a month     Alcohol use 0.0 oz/week     0 Standard drinks or equivalent per week      Comment: very rare     Family History   Problem Relation Age of Onset     CEREBROVASCULAR DISEASE Mother       from stroke/clotting \"vit. C deficiency\"     Arthritis Father      HEART DISEASE Father      CEREBROVASCULAR DISEASE Maternal Grandmother           \"     DIABETES Paternal Grandmother      Breast Cancer Paternal Grandmother      Arthritis Paternal Grandmother            Reviewed and updated as needed this visit by clinical staff  Tobacco  Allergies  Med Hx  Surg Hx  Fam " "Hx  Soc Hx      Reviewed and updated as needed this visit by Provider         ROS:  Constitutional, HEENT, cardiovascular, pulmonary, gi and gu systems are negative, except as otherwise noted.    OBJECTIVE:     /74 (BP Location: Right arm, Patient Position: Chair, Cuff Size: Adult Large)  Pulse 72  Temp 97.2  F (36.2  C) (Oral)  Resp 20  Wt 245 lb 11.2 oz (111.4 kg)  LMP 06/20/2017  BMI 46.42 kg/m2  Body mass index is 46.42 kg/(m^2).  GENERAL: healthy, alert and no distress  NECK: no adenopathy, no asymmetry, masses, or scars and thyroid normal to palpation  RESP: lungs clear to auscultation - no rales, rhonchi or wheezes  CV: regular rate and rhythm, normal S1 S2, no S3 or S4, no murmur, click or rub, no peripheral edema and peripheral pulses strong  ABDOMEN: soft, nontender, no hepatosplenomegaly, no masses and bowel sounds normal  MS: no gross musculoskeletal defects noted, no edema    Diagnostic Test Results:  No results found for this or any previous visit (from the past 24 hour(s)).    ASSESSMENT/PLAN:     BMI:   Estimated body mass index is 46.42 kg/(m^2) as calculated from the following:    Height as of 5/5/17: 5' 1\" (1.549 m).    Weight as of this encounter: 245 lb 11.2 oz (111.4 kg).   Weight management plan: Patient was referred to their PCP to discuss a diet and exercise plan.      1. Cellulitis of right lower extremity  Doing well on current medications.  Continue same.  Hilton Bryan PA-C  [facility]    "

## 2018-01-27 ENCOUNTER — OFFICE VISIT (OUTPATIENT)
Dept: URGENT CARE | Facility: RETAIL CLINIC | Age: 43
End: 2018-01-27
Payer: COMMERCIAL

## 2018-01-27 VITALS
SYSTOLIC BLOOD PRESSURE: 123 MMHG | TEMPERATURE: 99.7 F | HEART RATE: 95 BPM | DIASTOLIC BLOOD PRESSURE: 69 MMHG | OXYGEN SATURATION: 97 %

## 2018-01-27 DIAGNOSIS — R80.9 PROTEINURIA, UNSPECIFIED TYPE: ICD-10-CM

## 2018-01-27 DIAGNOSIS — R30.0 DYSURIA: Primary | ICD-10-CM

## 2018-01-27 DIAGNOSIS — N30.01 ACUTE CYSTITIS WITH HEMATURIA: ICD-10-CM

## 2018-01-27 LAB
APPEARANCE UR: NORMAL
BILIRUB UR QL: NORMAL
COLOR UR: NORMAL
GLUCOSE URINE: NORMAL MG/DL
HGB UR QL: NORMAL
KETONES UR QL: 5 MG/DL
LEUKOCYTE ESTERASE URINE: NORMAL
NITRITE UR QL STRIP: NORMAL
PH UR STRIP: 5.5 PH (ref 5–7)
PROTEIN ALBUMIN URINE: 300 MG/DL
SOURCE: NORMAL
SP GR UR STRIP: 1.03 (ref 1–1.03)
UROBILINOGEN UR QL STRIP: 0.2 EU/DL (ref 0.2–1)

## 2018-01-27 PROCEDURE — 81002 URINALYSIS NONAUTO W/O SCOPE: CPT | Mod: QW | Performed by: PHYSICIAN ASSISTANT

## 2018-01-27 PROCEDURE — 99213 OFFICE O/P EST LOW 20 MIN: CPT | Performed by: PHYSICIAN ASSISTANT

## 2018-01-27 PROCEDURE — 87086 URINE CULTURE/COLONY COUNT: CPT | Performed by: PHYSICIAN ASSISTANT

## 2018-01-27 RX ORDER — CIPROFLOXACIN 250 MG/1
250 TABLET, FILM COATED ORAL 2 TIMES DAILY
Qty: 6 TABLET | Refills: 0 | Status: SHIPPED | OUTPATIENT
Start: 2018-01-27 | End: 2018-01-29

## 2018-01-27 NOTE — NURSING NOTE
"Chief Complaint   Patient presents with     Dysuria     dysuria and frequency since this am - states has not had anything to drink today        Initial /69 (BP Location: Right arm, Patient Position: Chair, Cuff Size: Adult Large)  Pulse 95  Temp 99.7  F (37.6  C) (Tympanic)  SpO2 97% Estimated body mass index is 46.42 kg/(m^2) as calculated from the following:    Height as of 5/5/17: 5' 1\" (1.549 m).    Weight as of 7/3/17: 245 lb 11.2 oz (111.4 kg).  Medication Reconciliation: complete     Jessica Sundet        "

## 2018-01-27 NOTE — MR AVS SNAPSHOT
"              After Visit Summary   2018    Taylor Rey    MRN: [id number]           Patient Information     Date Of Birth          1975        Visit Information        Provider Department      2018 2:35 PM Cydney Mccray PA-C [facility]        Today's Diagnoses     Dysuria    -  1    Proteinuria, unspecified type        Acute cystitis with hematuria           Follow-ups after your visit        Who to contact     You can reach your care team any time of the day by calling [phone].  Notification of test results:  If you have an abnormal lab result, we will notify you by phone as soon as possible.         Additional Information About Your Visit        MyChart Information     [email or web address] lets you send messages to your doctor, view your test results, renew your prescriptions, schedule appointments and more. To sign up, go to [email or web address] . Click on \"Log in\" on the left side of the screen, which will take you to the Welcome page. Then click on \"Sign up Now\" on the right side of the page.     You will be asked to enter the access code listed below, as well as some personal information. Please follow the directions to create your username and password.     Your access code is: [email or web address]  Expires: 2018  2:51 PM     Your access code will  in 90 days. If you need help or a new code, please call your [facility] clinic or [phone].        Care EveryWhere ID     This is your [facility] EveryWhere ID. This could be used by other organizations to access your [facility] medical records  [id number]        Your Vitals Were     Pulse Temperature Pulse Oximetry             95 99.7  F (37.6  C) (Tympanic) 97%          Blood Pressure from Last 3 Encounters:   18 123/69   17 122/74   17 117/78    Weight from Last 3 Encounters:   17 245 lb 11.2 oz (111.4 kg)   17 245 lb (111.1 kg)   17 245 lb (111.1 kg)              We Performed the Following "     HCL U/A, W/O MICRO, NON AUTO     Urine Culture Aerobic Bacterial          Today's Medication Changes          These changes are accurate as of 1/27/18  2:51 PM.  If you have any questions, ask your nurse or doctor.               Start taking these medicines.        Dose/Directions    ciprofloxacin 250 MG tablet   Commonly known as:  CIPRO   Used for:  Acute cystitis with hematuria   Started by:  Cydney Mccray PA-C        Dose:  250 mg   Take 1 tablet (250 mg) by mouth 2 times daily   Quantity:  6 tablet   Refills:  0            Where to get your medicines      These medications were sent to [address]  [address], [facility] [address]     Phone:  [phone]     ciprofloxacin 250 MG tablet                Primary Care Provider Office Phone # Fax #    Ramya Vogt Anjelica, APRN [phone]       [address] [facility] [address]        Equal Access to Services     [facility]: Hadii dionicio ku hadasho Soomaali, waaxda luqadaha, qaybta kaalmada adeegyada, kleber neilin haygenna reynoso . So [facility] [phone].    ATENCIÓN: Si habla español, tiene a holbrook disposición servicios gratuitos de asistencia lingüística. Llame al [phone].    We comply with applicable federal civil rights laws and [place] laws. We do not discriminate on the basis of race, color, national origin, age, disability, sex, sexual orientation, or gender identity.            Thank you!     Thank you for choosing [facility]  for your care. Our goal is always to provide you with excellent care. Hearing back from our patients is one way we can continue to improve our services. Please take a few minutes to complete the written survey that you may receive in the mail after your visit with us. Thank you!             Your Updated Medication List - Protect others around you: Learn how to safely use, store and throw away your medicines at www.disposemymeds.org.           This list is accurate as of 1/27/18  2:51 PM.  Always use your most recent med list.                   Brand Name Dispense Instructions for use Diagnosis    celecoxib 100 MG capsule    celeBREX    60 capsule    Take 1 capsule (100 mg) by mouth 2 times daily as needed for moderate pain    Multiple joint pain       ciprofloxacin 250 MG tablet    CIPRO    6 tablet    Take 1 tablet (250 mg) by mouth 2 times daily    Acute cystitis with hematuria       losartan 25 MG tablet    COZAAR    90 tablet    Take 1 tablet (25 mg) by mouth daily    Benign essential hypertension       mupirocin 2 % cream    BACTROBAN    22 g    Apply topically 3 times daily    Rash

## 2018-01-27 NOTE — PROGRESS NOTES
SUBJECTIVE:  Taylor Rey is a 42 year old female who  presents today for a possible UTI. Symptoms of dysuria, frequency and burning have been going on for 2day(s).  Hematuria yes .  sudden onsetand moderate.  There is no history of fever, chills, nausea or vomiting.  No history of vaginal or penile discharge. This patient does not have a history of urinary tract infections. Patient denies rigors, flank pain, temperature > 101 degrees F. and Vomiting, significant nausea or diarrhea or vaginal discharge and vaginal odor     Past Medical History:   Diagnosis Date     Hypertension      Morbid obesity with BMI of 45.0-49.9, adult (H) 5/5/2017     SPRAIN OF ANKLE NOS 11/4/2005     Current Outpatient Prescriptions   Medication Sig Dispense Refill     mupirocin (BACTROBAN) 2 % cream Apply topically 3 times daily (Patient not taking: Reported on 1/27/2018) 22 g 0     celecoxib (CELEBREX) 100 MG capsule Take 1 capsule (100 mg) by mouth 2 times daily as needed for moderate pain (Patient not taking: Reported on 1/27/2018) 60 capsule 1     losartan (COZAAR) 25 MG tablet Take 1 tablet (25 mg) by mouth daily (Patient not taking: Reported on 1/27/2018) 90 tablet 3     Social History   Substance Use Topics     Smoking status: Former Smoker     Quit date: 5/5/2005     Smokeless tobacco: Never Used      Comment: one pack a month     Alcohol use 0.0 oz/week     0 Standard drinks or equivalent per week      Comment: very rare       ROS:   Review of systems negative except as stated above.    OBJECTIVE:  /69 (BP Location: Right arm, Patient Position: Chair, Cuff Size: Adult Large)  Pulse 95  Temp 99.7  F (37.6  C) (Tympanic)  SpO2 97%  GENERAL APPEARANCE: alert, active, no distress and obese  ABDOMEN:  soft, nontender, no HSM or masses and bowel sounds normal  BACK: No CVA tenderness  SKIN: no suspicious lesions or rashes    Results for orders placed or performed in visit on 01/27/18   HCL U/A, W/O MICRO, NON AUTO   Result  Value Ref Range    Source [place]     Color Urine Other     Appearance Urine Other     Glucose Urine Neg neg mg/dL    Bilirubin Urine Small neg    Ketones Urine 5  neg mg/dL    Specific Gravity Urine 1.030 1.003 - 1.035    Blood Urine Large neg    pH Urine 5.5 5.0 - 7.0 pH    Protein Albumin Urine 300  neg mg/dL    Urobilinogen Urine 0.2 0.2 - 1.0 EU/dL    Nitrite Urine Neg NEG    Leukocyte Esterase Urine Small NEG         ASSESSMENT:      Dysuria  Proteinuria, unspecified type  Acute cystitis with hematuria      PLAN:  Start Cipro - see orders.  Extremely important that she follow-up with her primary care provider who she states is now Ramya Dejesus in [place].  She is a large amount of protein spilling.  Also states she does not really drink any water-the importance of pushing fluids stressed to her today.  Drink plenty of fluids.  Prevention and treatment of UTI's discussed.Signs and symptoms of pyelonephritis mentioned.  Follow up with primary care provider if not improving.    Orders Placed This Encounter     HCL U/A, W/O MICRO, NON AUTO     ciprofloxacin (CIPRO) 250 MG tablet       AVS given to patient upon discharge today.  Electronically signed by Cydney Mccray PA-C  January 27, 2018  2:49 PM

## 2018-01-28 LAB
BACTERIA SPEC CULT: NORMAL
Lab: NORMAL
SPECIMEN SOURCE: NORMAL

## 2018-01-29 ENCOUNTER — TELEPHONE (OUTPATIENT)
Dept: URGENT CARE | Facility: RETAIL CLINIC | Age: 43
End: 2018-01-29

## 2018-01-29 NOTE — TELEPHONE ENCOUNTER
Please contact patient. Her UC indicates she does not have a bladder infection - minimal growth - probable contamination and not infection. She should stop her antibiotic and if ongoing symptoms FOLLOW UP with PCP.

## 2018-02-07 DIAGNOSIS — M25.562 LEFT KNEE PAIN, UNSPECIFIED CHRONICITY: Primary | ICD-10-CM

## 2018-02-07 DIAGNOSIS — M25.561 RIGHT KNEE PAIN, UNSPECIFIED CHRONICITY: ICD-10-CM

## 2018-02-08 ENCOUNTER — OFFICE VISIT (OUTPATIENT)
Dept: FAMILY MEDICINE | Facility: OTHER | Age: 43
End: 2018-02-08
Payer: COMMERCIAL

## 2018-02-08 VITALS
WEIGHT: 250 LBS | TEMPERATURE: 98.6 F | HEART RATE: 100 BPM | OXYGEN SATURATION: 100 % | BODY MASS INDEX: 46.01 KG/M2 | RESPIRATION RATE: 20 BRPM | SYSTOLIC BLOOD PRESSURE: 130 MMHG | HEIGHT: 62 IN | DIASTOLIC BLOOD PRESSURE: 80 MMHG

## 2018-02-08 DIAGNOSIS — N06.9 ISOLATED PROTEINURIA WITH MORPHOLOGIC LESION: Primary | ICD-10-CM

## 2018-02-08 DIAGNOSIS — R30.0 DYSURIA: ICD-10-CM

## 2018-02-08 DIAGNOSIS — M79.671 RIGHT FOOT PAIN: ICD-10-CM

## 2018-02-08 DIAGNOSIS — E66.01 MORBID OBESITY WITH BMI OF 45.0-49.9, ADULT (H): ICD-10-CM

## 2018-02-08 LAB
ALBUMIN UR-MCNC: ABNORMAL MG/DL
APPEARANCE UR: CLEAR
BILIRUB UR QL STRIP: NEGATIVE
COLOR UR AUTO: YELLOW
GLUCOSE UR STRIP-MCNC: NEGATIVE MG/DL
HGB UR QL STRIP: NEGATIVE
KETONES UR STRIP-MCNC: NEGATIVE MG/DL
LEUKOCYTE ESTERASE UR QL STRIP: NEGATIVE
MUCOUS THREADS #/AREA URNS LPF: PRESENT /LPF
NITRATE UR QL: NEGATIVE
NON-SQ EPI CELLS #/AREA URNS LPF: ABNORMAL /LPF
PH UR STRIP: 7.5 PH (ref 5–7)
RBC #/AREA URNS AUTO: ABNORMAL /HPF
SOURCE: ABNORMAL
SP GR UR STRIP: 1.02 (ref 1–1.03)
UROBILINOGEN UR STRIP-ACNC: 0.2 EU/DL (ref 0.2–1)
WBC #/AREA URNS AUTO: ABNORMAL /HPF

## 2018-02-08 PROCEDURE — 81001 URINALYSIS AUTO W/SCOPE: CPT | Performed by: NURSE PRACTITIONER

## 2018-02-08 PROCEDURE — 99213 OFFICE O/P EST LOW 20 MIN: CPT | Performed by: NURSE PRACTITIONER

## 2018-02-08 NOTE — NURSING NOTE
"Chief Complaint   Patient presents with     RECHECK     uti from 1/27/18       Initial /80  Pulse 100  Temp 98.6  F (37  C) (Tympanic)  Resp 20  Ht 5' 1.5\" (1.562 m)  Wt 250 lb (113.4 kg)  LMP 01/12/2018 (Approximate)  SpO2 100%  Breastfeeding? No  BMI 46.47 kg/m2 Estimated body mass index is 46.47 kg/(m^2) as calculated from the following:    Height as of this encounter: 5' 1.5\" (1.562 m).    Weight as of this encounter: 250 lb (113.4 kg).  Medication Reconciliation: complete   ................Hua Bowen LPN,   February 8, 2018,      3:04 PM,   [facility]    "

## 2018-02-08 NOTE — PATIENT INSTRUCTIONS
Your urine was basically normal today, only a trace amount of protein seen.      I don't think you need any further follow up on this as it was all likely related to the infection.     See PT about your foot and knee

## 2018-02-08 NOTE — MR AVS SNAPSHOT
"              After Visit Summary   2/8/2018    Taylor Rey    MRN: [id number]           Patient Information     Date Of Birth          1975        Visit Information        Provider Department      2/8/2018 3:00 PM Ramya Dejesus APRN [facility]        Today's Diagnoses     Dysuria    -  1    Right foot pain          Care Instructions    Your urine was basically normal today, only a trace amount of protein seen.      I don't think you need any further follow up on this as it was all likely related to the infection.     See PT about your foot and knee          Follow-ups after your visit        Additional Services     PHYSICAL THERAPY REFERRAL       *This therapy referral will be filtered to a centralized scheduling office at [facility] and the patient will receive a call to schedule an appointment at a [facility] location most convenient for them. *     [facility] provides Physical Therapy evaluation and treatment and many specialty services across the [facility] system.  If requesting a specialty program, please choose from the list below.    If you have not heard from the scheduling office within 2 business days, please call [phone] for all locations, with the exception of [place], please call [phone].  Treatment: Evaluation & Treatment  Special Instructions/Modalities:   Special Programs: None    Please be aware that coverage of these services is subject to the terms and limitations of your health insurance plan.  Call member services at your health plan with any benefit or coverage questions.      **Note to Provider:  If you are referring outside of [facility] for the therapy appointment, please list the name of the location in the \"special instructions\" above, print the referral and give to the patient to schedule the appointment.                  Who to contact     If you have questions or need follow up information about today's clinic " "visit or your schedule please contact [facility] directly at [phone].  Normal or non-critical lab and imaging results will be communicated to you by MyChart, letter or phone within 4 business days after the clinic has received the results. If you do not hear from us within 7 days, please contact the clinic through [facility] or phone. If you have a critical or abnormal lab result, we will notify you by phone as soon as possible.  Submit refill requests through [employer] or call your pharmacy and they will forward the refill request to us. Please allow 3 business days for your refill to be completed.          Additional Information About Your Visit        MyChart Information     [employer]le lets you send messages to your doctor, view your test results, renew your prescriptions, schedule appointments and more. To sign up, go to [email or web address] . Click on \"Log in\" on the left side of the screen, which will take you to the Welcome page. Then click on \"Sign up Now\" on the right side of the page.     You will be asked to enter the access code listed below, as well as some personal information. Please follow the directions to create your username and password.     Your access code is: [email or web address]  Expires: 2018  2:51 PM     Your access code will  in 90 days. If you need help or a new code, please call your [facility] clinic or [phone].        Care EveryWhere ID     This is your Care EveryWhere ID. This could be used by other organizations to access your [facility] medical records  [id number]        Your Vitals Were     Pulse Temperature Respirations Height Last Period Pulse Oximetry    100 98.6  F (37  C) (Tympanic) 20 5' 1.5\" (1.562 m) 2018 (Approximate) 100%    Breastfeeding? BMI (Body Mass Index)                No 46.47 kg/m2           Blood Pressure from Last 3 Encounters:   18 130/80   18 123/69   17 122/74    Weight from Last 3 Encounters:   18 250 lb " (113.4 kg)   07/03/17 245 lb 11.2 oz (111.4 kg)   07/01/17 245 lb (111.1 kg)              We Performed the Following     *UA reflex to Microscopic and Culture ([place] and [facility] (except [place] and [place])     PHYSICAL THERAPY REFERRAL     Urine Microscopic        Primary Care Provider Office Phone # Fax #    PAOLA Moe [phone]       [address] [facility] [address]        Equal Access to Services     LAURIE PALACIOS : Hadii aad ku hadasho Soomaali, waaxda luqadaha, qaybta kaalmada adeegyada, waxay idiin hayaan adeeg kharash angeles . So [facility] [phone].    ATENCIÓN: Si habla español, tiene a holbrook disposición servicios gratuitos de asistencia lingüística. Llame al [phone].    We comply with applicable federal civil rights laws and [place] laws. We do not discriminate on the basis of race, color, national origin, age, disability, sex, sexual orientation, or gender identity.            Thank you!     Thank you for choosing [facility]  for your care. Our goal is always to provide you with excellent care. Hearing back from our patients is one way we can continue to improve our services. Please take a few minutes to complete the written survey that you may receive in the mail after your visit with us. Thank you!             Your Updated Medication List - Protect others around you: Learn how to safely use, store and throw away your medicines at www.disposemymeds.org.          This list is accurate as of 2/8/18  3:30 PM.  Always use your most recent med list.                   Brand Name Dispense Instructions for use Diagnosis    celecoxib 100 MG capsule    celeBREX    60 capsule    Take 1 capsule (100 mg) by mouth 2 times daily as needed for moderate pain    Multiple joint pain       losartan 25 MG tablet    COZAAR    90 tablet    Take 1 tablet (25 mg) by mouth daily    Benign essential hypertension       mupirocin 2 % cream    BACTROBAN    22 g    Apply  topically 3 times daily    Rash

## 2018-02-08 NOTE — PROGRESS NOTES
"  SUBJECTIVE:   Taylor Rey is a 42 year old female who presents to clinic today for the following health issues:      Medication Followup of Cipro    Taking Medication as prescribed: yes, now done    Side Effects:  None    Medication Helping Symptoms:  Yes    Recheck protein and blood in urine today     Seen in Urgent Care on 18 for UTI.  Had a large amount of protein in her urine and was advised to follow up for recheck in clinic.  She was given Cipro x 3 days.  Symptoms have all resolved.       Problem list and histories reviewed & adjusted, as indicated.  Additional history: none    Patient Active Problem List   Diagnosis     Hypertension goal BP (blood pressure) < 140/90     Morbid obesity with BMI of 45.0-49.9, adult (H)     Past Surgical History:   Procedure Laterality Date     C  DELIVERY ONLY  2/10/2004    , Low Cervical     C FULL ROUT OBSTE CARE, DELIV      pt. had toxemia/fetal distress related to amniotic fluid     C LIGATION,FALLOPIAN TUBE W/  2/10/2004     CHOLECYSTECTOMY, LAPOROSCOPIC  2006    Cholecystectomy, Laparoscopic     HC REMOVE TONSILS/ADENOIDS,12+ Y/O         Social History   Substance Use Topics     Smoking status: Former Smoker     Quit date: 2005     Smokeless tobacco: Never Used      Comment: one pack a month     Alcohol use 0.0 oz/week     0 Standard drinks or equivalent per week      Comment: very rare     Family History   Problem Relation Age of Onset     CEREBROVASCULAR DISEASE Mother       from stroke/clotting \"vit. C deficiency\"     Arthritis Father      HEART DISEASE Father      CEREBROVASCULAR DISEASE Maternal Grandmother           \"     DIABETES Paternal Grandmother      Breast Cancer Paternal Grandmother      Arthritis Paternal Grandmother          Current Outpatient Prescriptions   Medication Sig Dispense Refill     mupirocin (BACTROBAN) 2 % cream Apply topically 3 times daily (Patient not taking: Reported on " "1/27/2018) 22 g 0     celecoxib (CELEBREX) 100 MG capsule Take 1 capsule (100 mg) by mouth 2 times daily as needed for moderate pain (Patient not taking: Reported on 1/27/2018) 60 capsule 1     losartan (COZAAR) 25 MG tablet Take 1 tablet (25 mg) by mouth daily (Patient not taking: Reported on 1/27/2018) 90 tablet 3     Allergies   Allergen Reactions     Lisinopril Cough     Sulfa Drugs      Azo [Phenazopyridine] Rash     BP Readings from Last 3 Encounters:   02/08/18 130/80   01/27/18 123/69   07/03/17 122/74    Wt Readings from Last 3 Encounters:   02/08/18 250 lb (113.4 kg)   07/03/17 245 lb 11.2 oz (111.4 kg)   07/01/17 245 lb (111.1 kg)                    Reviewed and updated as needed this visit by clinical staff  Tobacco  Allergies  Meds  Med Hx       Reviewed and updated as needed this visit by Provider         ROS:  Constitutional, HEENT, cardiovascular, pulmonary, gi and gu systems are negative, except as otherwise noted.    OBJECTIVE:     /80  Pulse 100  Temp 98.6  F (37  C) (Tympanic)  Resp 20  Ht 5' 1.5\" (1.562 m)  Wt 250 lb (113.4 kg)  LMP 01/12/2018 (Approximate)  SpO2 100%  Breastfeeding? No  BMI 46.47 kg/m2  Body mass index is 46.47 kg/(m^2).  GENERAL: alert, no distress and obese  RESP: lungs clear to auscultation - no rales, rhonchi or wheezes  CV: regular rate and rhythm, normal S1 S2, no S3 or S4, no murmur, click or rub, no peripheral edema and peripheral pulses strong  ABDOMEN: soft, nontender, no hepatosplenomegaly, no masses and bowel sounds normal  MS: no gross musculoskeletal defects noted, no edema    Diagnostic Test Results:  Office Visit on 02/08/2018   Component Date Value Ref Range Status     Color Urine 02/08/2018 Yellow   Final     Appearance Urine 02/08/2018 Clear   Final     Glucose Urine 02/08/2018 Negative  NEG^Negative mg/dL Final     Bilirubin Urine 02/08/2018 Negative  NEG^Negative Final     Ketones Urine 02/08/2018 Negative  NEG^Negative mg/dL Final     " Specific Gravity Urine 02/08/2018 1.020  1.003 - 1.035 Final     Blood Urine 02/08/2018 Negative  NEG^Negative Final     pH Urine 02/08/2018 7.5* 5.0 - 7.0 pH Final     Protein Albumin Urine 02/08/2018 Trace* NEG^Negative mg/dL Final     Urobilinogen Urine 02/08/2018 0.2  0.2 - 1.0 EU/dL Final     Nitrite Urine 02/08/2018 Negative  NEG^Negative Final     Leukocyte Esterase Urine 02/08/2018 Negative  NEG^Negative Final     Source 02/08/2018 Midstream Urine   Final     WBC Urine 02/08/2018 2-5* OTO2^O - 2 /HPF Final     RBC Urine 02/08/2018 O - 2  OTO2^O - 2 /HPF Final     Squamous Epithelial /LPF Urine 02/08/2018 Few  FEW^Few /LPF Final     Mucous Urine 02/08/2018 Present* NEG^Negative /LPF Final           ASSESSMENT/PLAN:         1. Isolated proteinuria with morphologic lesion  Related to UTI, has nearly resolved today.  She is having no further symptoms.      2. Dysuria  - *UA reflex to Microscopic and Culture ([place] and [facility] (except [place] and [place])  - Urine Microscopic    3. Right foot pain  Ongoing issues, needs new referral to PT.  - PHYSICAL THERAPY REFERRAL    See Patient Instructions    PAOLA Moe [facility]

## 2018-02-21 ENCOUNTER — HOSPITAL ENCOUNTER (OUTPATIENT)
Dept: PHYSICAL THERAPY | Facility: OTHER | Age: 43
Setting detail: THERAPIES SERIES
End: 2018-02-21
Attending: NURSE PRACTITIONER
Payer: COMMERCIAL

## 2018-02-21 DIAGNOSIS — M25.561 PAIN IN BOTH KNEES, UNSPECIFIED CHRONICITY: ICD-10-CM

## 2018-02-21 DIAGNOSIS — M21.42 ACQUIRED PES PLANUS OF BOTH FEET: Primary | ICD-10-CM

## 2018-02-21 DIAGNOSIS — M25.562 PAIN IN BOTH KNEES, UNSPECIFIED CHRONICITY: ICD-10-CM

## 2018-02-21 DIAGNOSIS — M21.41 ACQUIRED PES PLANUS OF BOTH FEET: Primary | ICD-10-CM

## 2018-02-21 DIAGNOSIS — M25.571 PAIN IN JOINT, ANKLE AND FOOT, RIGHT: ICD-10-CM

## 2018-02-21 PROCEDURE — 97161 PT EVAL LOW COMPLEX 20 MIN: CPT | Mod: GP | Performed by: PHYSICAL THERAPIST

## 2018-02-21 PROCEDURE — 97110 THERAPEUTIC EXERCISES: CPT | Mod: GP | Performed by: PHYSICAL THERAPIST

## 2018-02-21 PROCEDURE — 40000718 ZZHC STATISTIC PT DEPARTMENT ORTHO VISIT: Performed by: PHYSICAL THERAPIST

## 2018-02-21 NOTE — PROGRESS NOTES
02/21/18 1400   Lower Extremity Functional Scale ( 1996 TASHIA Brown: used with permission)   a. Any of your usual work, housework, or school activities. 2-Moderate Difficulty   b. Your usual hobbies, recreational or sporting activities.  3-A Little Bit of Difficulty   c. Getting into or out of the bath. 3-A Little Bit of Difficulty   d. Walking between rooms. 4-No Difficulty   e. Putting on your shoes or socks. 3-A Little Bit of Difficulty   f. Squatting. 1-Quite a Bit of Difficulty   g. Lifting an object, like a bag of groceries from the floor.  4-No Difficulty   h. Performing light activities around your home.  4-No Difficulty   i. Performing heavy activities around your home. 4-No Difficulty   j. Getting into or out of a car. 4-No Difficulty   k. Walking 2 blocks. 4-No Difficulty   l. Walking a mile. 4-No Difficulty   m. Going up or down 10 stairs (about 1 flight of stairs). 3-A Little Bit of Difficulty   n. Standing for 1 hour. 3-A Little Bit of Difficulty   o. Sitting for 1 hour. 3-A Little Bit of Difficulty   p. Running on even ground. 3-A Little Bit of Difficulty   q. Running on uneven ground. 3-A Little Bit of Difficulty   r. Making sharp turns while running fast. 3-A Little Bit of Difficulty   s. Hopping. 3-A Little Bit of Difficulty   t. Rolling over in bed. 4-No Difficulty   SCORE:    Column Totals: /80 65

## 2018-02-21 NOTE — PROGRESS NOTES
02/21/18 1433   General Information   Type of Visit Initial OP Ortho PT Evaluation   Start of Care Date 02/21/18   Referring Physician dequan wilkes CNP   Patient/Family Goals Statement B knee and right ankle   Orders Evaluate and Treat   Date of Order 02/07/18   Insurance Type Other  (blue plus )   Medical Diagnosis left and right knee pain M25.562 and M25.561right foot pain M79.671   Surgical/Medical history reviewed Yes   Precautions/Limitations no known precautions/limitations   Weight-Bearing Status - LLE full weight-bearing   Weight-Bearing Status - RLE full weight-bearing   Body Part(s)   Body Part(s) Ankle/Foot;Knee   Presentation and Etiology   Pertinent history of current problem (include personal factors and/or comorbidities that impact the POC) patient reports that last 1 year ago right knee > left knee pain and right ankle pain . no injury just progressively getting more painfull . has pain on medial and anterior right foot . and has anterior right knee pain and when squatting down will have pain B posterior knee. PMH none reported . wears a knee brace that helps and wears good supportive shoes for work . work 36 hours at [employer] and is on her feet x 12 years .    Impairments A. Pain   Functional Limitations perform activities of daily living;perform required work activities   Symptom Location right ankle and B knee    Onset date of current episode/exacerbation (2/21/2017)   Chronicity Chronic   Pain rating (0-10 point scale) Best (/10);Worst (/10)   Best (/10) right knee 4/10 left knee 2/10 right ankle 2/10   Worst (/10) right knee 6/10 left knee 2/10 right ankle 4/10   Pain quality B. Dull;C. Aching   Frequency of pain/symptoms A. Constant   Pain/symptoms exacerbated by L. Work tasks   Pain/symptoms eased by C. Rest;D. Nothing   Progression of symptoms since onset: Worsened   Prior Level of Function   Functional Level Prior Comment none    Current Level of Function   Current Community Support  Family/friend caregiver   Patient role/employment history A. Employed   Fall Risk Screen   Fall screen completed by PT   Have you fallen 2 or more times in the past year? No   Have you fallen and had an injury in the past year? No   Is patient a fall risk? No   Ankle/Foot Objective Findings   Observation no swelling    Anterior Drawer Test neg   Ankle/Foot Special Tests Comments inversion and eversion stress test neg   Knee Objective Findings   Foot Position In Standing B very flat footed    Knee/Hip Strength Comments patient has good strength 4/5 in B LE    Anterior Drawer Test neg   Posterior Drawer Test neg   Varus Stress Test neg   Valgus Stress Test neg   Leonila's Test neg   Palpation anterior knee pain on right and posterior on right , right anterior ankle    Planned Therapy Interventions   Planned Therapy Interventions ADL retraining;ROM;strengthening;stretching   Clinical Impression   Criteria for Skilled Therapeutic Interventions Met yes, treatment indicated   PT Diagnosis B knee pain and right ankle pain    Functional limitations due to impairments LEFS 65/80   Clinical Presentation Stable/Uncomplicated   Clinical Decision Making (Complexity) Low complexity   Predicted Duration of Therapy Intervention (days/wks) recheck in 2 weeks , 4 Rx over 2 months    Risk & Benefits of therapy have been explained Yes   Patient, Family & other staff in agreement with plan of care Yes   Clinical Impression Comments patient seen due to chronic B R>L knee pain and right ankle pain , presents with limited flexability , very B flat footed , and weakness , Rx is exercises in clinic and progression of HEP    Education Assessment   Preferred Learning Style Listening;Reading;Demonstration   Barriers to Learning No barriers   ORTHO GOALS   PT Ortho Eval Goals 1;2   Ortho Goal 1   Goal Identifier 1   Goal Description instruction in HEP and compliant 5 of 7 days    Target Date 04/21/18   Ortho Goal 2   Goal Identifier 2   Goal  Description patient to have reduction in pain level 2-6/10 goal is 2-3/10 and improved tolerance to activity currently LEFS 65/80 goal 70/80   Target Date 04/21/18   Total Evaluation Time   Total Evaluation Time 15

## 2018-03-07 ENCOUNTER — HOSPITAL ENCOUNTER (OUTPATIENT)
Dept: PHYSICAL THERAPY | Facility: OTHER | Age: 43
Setting detail: THERAPIES SERIES
End: 2018-03-07
Attending: NURSE PRACTITIONER
Payer: COMMERCIAL

## 2018-03-07 PROCEDURE — 40000718 ZZHC STATISTIC PT DEPARTMENT ORTHO VISIT: Performed by: PHYSICAL THERAPIST

## 2018-03-07 PROCEDURE — 97110 THERAPEUTIC EXERCISES: CPT | Mod: GP | Performed by: PHYSICAL THERAPIST

## 2018-03-21 ENCOUNTER — HOSPITAL ENCOUNTER (OUTPATIENT)
Dept: PHYSICAL THERAPY | Facility: OTHER | Age: 43
Setting detail: THERAPIES SERIES
End: 2018-03-21
Attending: NURSE PRACTITIONER
Payer: COMMERCIAL

## 2018-03-21 PROCEDURE — 97110 THERAPEUTIC EXERCISES: CPT | Mod: GP | Performed by: PHYSICAL THERAPIST

## 2018-03-21 PROCEDURE — 40000718 ZZHC STATISTIC PT DEPARTMENT ORTHO VISIT: Performed by: PHYSICAL THERAPIST

## 2018-03-29 NOTE — PROGRESS NOTES
Outpatient Physical Therapy Discharge Note     Patient: Taylor Rey  : 1975    Beginning/End Dates of Reporting Period:  2018 to 3/29/2018    Referring Provider: dequan wilkes CNP    Therapy Diagnosis: B knee and right ankle pain      Client Self Report: knees have been achy but not as much sharp shooting pain ,     Objective Measurements:  Objective Measure: LEFS65/80 pain 2-6/10 at eval   Details: pain 2-4/10 not having the shooting pain and overall 50% less bad days  LEFS 67/80      patient seen for 3 Rx sessions for exercises in clinic and progression of HEP at last Rx she was completing the following   bridges , SLR , TKE , hip adduction isometric 15 goal 30 , 90/90 hold 10 x 5 , biodex x 15, blue theraband dorsiflexion , planterflexion , inversion . eversion 30x , single leg balance 1 minute, black bosu 5 minutes at bar     Goals:  Goal Identifier 1   Goal Description instruction in HEP and compliant 5 of 7 days    Target Date 18   Date Met      Progress:     Goal Identifier 2   Goal Description patient to have reduction in pain level 2-6/10 goal is 2-3/10 and improved tolerance to activity currently LEFS 65/80 goal 70/80   Target Date 18   Date Met      Progress:       Progress Toward Goals:   Progress this reporting period: patient is very compliant with HEP and is meeting her therapy goals           Plan:  Discharge from therapy.    Discharge:    Reason for Discharge: Patient has met all goals.    Equipment Issued: none    Discharge Plan: Patient to continue home program.

## 2018-03-29 NOTE — ADDENDUM NOTE
Encounter addended by: Viki Bal, PT on: 3/29/2018  8:47 AM<BR>     Actions taken: Episode resolved, Sign clinical note

## 2018-06-20 ENCOUNTER — OFFICE VISIT (OUTPATIENT)
Dept: FAMILY MEDICINE | Facility: OTHER | Age: 43
End: 2018-06-20
Payer: COMMERCIAL

## 2018-06-20 VITALS
HEART RATE: 117 BPM | BODY MASS INDEX: 45.54 KG/M2 | SYSTOLIC BLOOD PRESSURE: 142 MMHG | DIASTOLIC BLOOD PRESSURE: 82 MMHG | RESPIRATION RATE: 20 BRPM | WEIGHT: 245 LBS | OXYGEN SATURATION: 99 % | TEMPERATURE: 97.7 F

## 2018-06-20 DIAGNOSIS — I10 BENIGN ESSENTIAL HYPERTENSION: Primary | ICD-10-CM

## 2018-06-20 DIAGNOSIS — M77.11 LATERAL EPICONDYLITIS, RIGHT ELBOW: ICD-10-CM

## 2018-06-20 PROCEDURE — 99214 OFFICE O/P EST MOD 30 MIN: CPT | Performed by: NURSE PRACTITIONER

## 2018-06-20 RX ORDER — METHYLPREDNISOLONE 4 MG
TABLET, DOSE PACK ORAL
Qty: 21 TABLET | Refills: 0 | Status: SHIPPED | OUTPATIENT
Start: 2018-06-20 | End: 2019-04-05

## 2018-06-20 RX ORDER — LOSARTAN POTASSIUM 25 MG/1
25 TABLET ORAL DAILY
Qty: 90 TABLET | Refills: 3 | Status: SHIPPED | OUTPATIENT
Start: 2018-06-20 | End: 2019-07-08

## 2018-06-20 NOTE — MR AVS SNAPSHOT
After Visit Summary   6/20/2018    Taylor Rey    MRN: [id number]           Patient Information     Date Of Birth          1975        Visit Information        Provider Department      6/20/2018 3:40 PM Ramya Dejesus APRN [facility]        Today's Diagnoses     Benign essential hypertension    -  1    Lateral epicondylitis, right elbow          Care Instructions    Start your Losartan.     Have a nurse only blood pressure recheck in 1-2 weeks here.     Take the Medrol with food for your elbow.       Tennis Elbow  Muscles connect to bones by thick, fibrous cords (tendons). When the muscles are overused by repeated motion, the tendons may become inflamed and painful. This condition is called tendonitis.  Tennis elbow (lateral epicondylitis) is a form of tendonitis. It occurs when the forearm muscles are used again and again in a twisting motion. Pain from tennis elbow occurs mainly on the outside of the elbow. But the pain can spread into the forearm and wrist. Your elbow may also be swollen and tender to the touch.  The pain may get worse when you move your arm or do simple activities. Bending your wrist back, shaking hands, or turning a doorknob may cause pain. The pain often gets worse after several weeks or months. Sometimes you may feel pain when your arm is still.  Tennis players who use a backhand stroke with poor technique are more likely to get tennis elbow. But playing tennis is only one cause of tennis elbow. Other common activities that can cause it include:    Hammering    Painting    Raking  Besides tennis players, people at risk include , gardeners, musicians, and dentists. Sometimes people get tennis elbow without doing anything that would cause the injury.  Treatment includes resting the arm and taking anti-inflammatory medicines. Special splints can help ease symptoms. Symptoms should get better after 4 to 6 weeks of rest. You may need steroid  injections if resting and using a splint don t help. After the pain is relieved, you should change your activities so the symptoms don t return. You may need physical therapy. It may include stretching, range-of-motion, and strengthening exercises. These treatments help most cases. You may need surgery if your symptoms continue for 6 months despite treatment.  Home care  Follow these guidelines when caring for yourself at home:    Rest your elbow as needed. Protect it from movement that causes pain. You may be told to use a forearm splint at night to ease symptoms in the morning. Your healthcare provider may recommend a special wrap or splint to compress the muscles of the forearm. This can ease pain during daytime activities. As your symptoms get better, start to move your elbow more.    Put an ice pack on the injured area. Do this for 20 minutes every 1 to 2 hours the first day for pain relief. You can make an ice pack by wrapping a plastic bag of ice cubes in a thin towel. Continue using the ice pack 3 to 4 times a day for the next several days. Then use the ice pack as needed to ease pain and swelling.    You may use acetaminophen or ibuprofen to control pain, unless another pain medicine was prescribed. If you have chronic liver or kidney disease, talk with your healthcare provider before using these medicines. Also talk with your provider if you ve had a stomach ulcer or gastrointestinal bleeding.    After your elbow heals, avoid the motion that caused your pain. Or learn to move in a way that causes less stress on the tendon. Using a forearm wrap may keep tennis elbow from happening again.    A tennis elbow strap may ease pain and keep you from further injury when you start playing tennis again. You can also lower your risk for injury by warming up before you play and cooling down afterward. You should also use the right equipment. For instance, make sure your racquet has the right  and is the right size  for you.  Follow-up care  Follow up with your healthcare provider, or as advised, if your symptoms don t get better after 2 to 3 weeks of treatment.  When to seek medical advice  Call your healthcare provider right away if any of these occur:    Redness over the painful area    Pain, stiffness,  or swelling at the elbow gets worse    Any numbness or tingling in your arm, hands, or fingers    Unexplained fever over 100.4 F (38 C)   Date Last Reviewed: 5/1/2017 2000-2017 [employer]. [address] [place] [address]. All rights reserved. This information is not intended as a substitute for professional medical care. Always follow your healthcare professional's instructions.                            Follow-ups after your visit        Who to contact     If you have questions or need follow up information about today's clinic visit or your schedule please contact [facility] directly at [phone].  Normal or non-critical lab and imaging results will be communicated to you by MyChart, letter or phone within 4 business days after the clinic has received the results. If you do not hear from us within 7 days, please contact the clinic through MyChart or phone. If you have a critical or abnormal lab result, we will notify you by phone as soon as possible.  Submit refill requests through [employer] or call your pharmacy and they will forward the refill request to us. Please allow 3 business days for your refill to be completed.          Additional Information About Your Visit        Care EveryWhere ID     This is your Care EveryWhere ID. This could be used by other organizations to access your [facility] medical records  [id number]        Your Vitals Were     Pulse Temperature Respirations Last Period Pulse Oximetry Breastfeeding?    117 97.7  F (36.5  C) (Tympanic) 20 06/19/2018 99% No    BMI (Body Mass Index)                   45.54 kg/m2            Blood Pressure from Last 3  Encounters:   06/20/18 142/82   02/08/18 130/80   01/27/18 123/69    Weight from Last 3 Encounters:   06/20/18 245 lb (111.1 kg)   02/08/18 250 lb (113.4 kg)   07/03/17 245 lb 11.2 oz (111.4 kg)              Today, you had the following     No orders found for display         Today's Medication Changes          These changes are accurate as of 6/20/18  4:02 PM.  If you have any questions, ask your nurse or doctor.               Start taking these medicines.        Dose/Directions    methylPREDNISolone 4 MG tablet   Commonly known as:  MEDROL DOSEPAK   Used for:  Lateral epicondylitis, right elbow   Started by:  Ramya Dejesus APRN CNP        Follow package instructions   Quantity:  21 tablet   Refills:  0            Where to get your medicines      These medications were sent to [facility] [place] [address]   [address] [facility] [address]     Phone:  [phone]     losartan 25 MG tablet    methylPREDNISolone 4 MG tablet                Primary Care Provider Office Phone # Fax #    PAOLA Moe [phone]       [address] [facility] [address]        Equal Access to Services     LAURIE PALACIOS AH: Hadii dionicio poe hadasho Soomaali, waaxda luqadaha, qaybta kaalmada adeegyada, kleber rosario. So [facility] [phone].    ATENCIÓN: Si habla español, tiene a holbrook disposición servicios gratuitos de asistencia lingüística. Celestine al [phone].    We comply with applicable federal civil rights laws and [place] laws. We do not discriminate on the basis of race, color, national origin, age, disability, sex, sexual orientation, or gender identity.            Thank you!     Thank you for choosing [facility]  for your care. Our goal is always to provide you with excellent care. Hearing back from our patients is one way we can continue to improve our services. Please take a few minutes to complete the written survey that you may receive in  the mail after your visit with us. Thank you!             Your Updated Medication List - Protect others around you: Learn how to safely use, store and throw away your medicines at www.disposemymeds.org.          This list is accurate as of 6/20/18  4:02 PM.  Always use your most recent med list.                   Brand Name Dispense Instructions for use Diagnosis    losartan 25 MG tablet    COZAAR    90 tablet    Take 1 tablet (25 mg) by mouth daily    Benign essential hypertension       methylPREDNISolone 4 MG tablet    MEDROL DOSEPAK    21 tablet    Follow package instructions    Lateral epicondylitis, right elbow

## 2018-06-20 NOTE — PROGRESS NOTES
SUBJECTIVE:   Taylor Rey is a 43 year old female who presents to clinic today for the following health issues:      Medication Followup of Losartan    Taking Medication as prescribed: NO-stopped due to being stubborn    Side Effects:  None    Medication Was Helping Symptoms:  yes       Hypertension Follow-up      Outpatient blood pressures are not being checked.    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     Has also tried Ibuprofen, Aleve and Celebrex and those did not help.        Problem list and histories reviewed & adjusted, as indicated.  Additional history: as documented    Patient Active Problem List   Diagnosis     Hypertension goal BP (blood pressure) < 140/90     Morbid obesity with BMI of 45.0-49.9, adult (H)     Past Surgical History:   Procedure Laterality Date     C  DELIVERY ONLY  2/10/2004    , Low Cervical     C FULL ROUT OBSTE CARE, DELIV      pt. had toxemia/fetal distress related to amniotic fluid     C LIGATION,FALLOPIAN TUBE W/  2/10/2004     CHOLECYSTECTOMY, LAPOROSCOPIC  2006    Cholecystectomy, Laparoscopic     HC REMOVE TONSILS/ADENOIDS,12+ Y/O         Social History   Substance Use Topics     Smoking status: Former Smoker     Quit date: 2005     Smokeless tobacco: Never Used      Comment: one pack a month     Alcohol use 0.0 oz/week     0 Standard drinks or equivalent per week      Comment: very rare     Family History   Problem Relation Age of Onset     Cerebrovascular Disease Mother       " from stroke/clotting \"vit. C deficiency\"     Arthritis Father      HEART DISEASE Father      Cerebrovascular Disease Maternal Grandmother           \"     Diabetes Paternal Grandmother      Breast Cancer Paternal Grandmother      Arthritis Paternal Grandmother          Current Outpatient Prescriptions   Medication Sig Dispense Refill     losartan (COZAAR) 25 MG tablet Take 1 tablet (25 mg) by mouth daily 90 tablet 3     Allergies   Allergen Reactions     Lisinopril Cough     Sulfa Drugs      Azo [Phenazopyridine] Rash     BP Readings from Last 3 Encounters:   18 142/82   18 130/80   18 123/69    Wt Readings from Last 3 Encounters:   18 245 lb (111.1 kg)   18 250 lb (113.4 kg)   17 245 lb 11.2 oz (111.4 kg)                    Reviewed and updated as needed this visit by clinical staff  Tobacco  Meds  Med Hx  Surg Hx  Fam Hx  Soc Hx      Reviewed and updated as needed this visit by Provider         ROS:  Constitutional, HEENT, cardiovascular, pulmonary, gi and gu systems are negative, except as otherwise noted.    OBJECTIVE:     /82  Pulse 117  Temp 97.7  F (36.5  C) (Tympanic)  Resp 20  Wt 245 lb (111.1 kg)  LMP 2018  SpO2 99%  Breastfeeding? No  BMI 45.54 kg/m2  Body mass index is 45.54 kg/(m^2).  GENERAL: alert, no distress and obese  RESP: lungs clear to auscultation - no rales, rhonchi or wheezes  CV: regular rate and rhythm, normal S1 S2, no S3 or S4, no murmur, click or rub,   MS: no gross musculoskeletal defects noted, no edema, tenderness over the lateral epicondyle of right elbow.   SKIN: no suspicious lesions or rashes    Diagnostic Test Results:  none     ASSESSMENT/PLAN:     1. Benign essential hypertension  Restart Losartan as prescribed.  Recheck blood pressure in 1-2 weeks with nurse only visit.   - losartan (COZAAR) 25 MG tablet; Take 1 tablet (25 mg) by mouth daily  Dispense: 90 tablet; Refill: 3    2. Lateral epicondylitis, right " elbow  Already using a counter pressure brace, will try oral steroids as prescribed as symptoms are not improving with NSAIDS.   - methylPREDNISolone (MEDROL DOSEPAK) 4 MG tablet; Follow package instructions  Dispense: 21 tablet; Refill: 0    FUTURE APPOINTMENTS:       - Make appointment with nurse to check blood pressure in 1-2 weeks  See Patient Instructions    PAOLA Moe [facility]

## 2018-06-20 NOTE — PATIENT INSTRUCTIONS
Start your Losartan.     Have a nurse only blood pressure recheck in 1-2 weeks here.     Take the Medrol with food for your elbow.       Tennis Elbow  Muscles connect to bones by thick, fibrous cords (tendons). When the muscles are overused by repeated motion, the tendons may become inflamed and painful. This condition is called tendonitis.  Tennis elbow (lateral epicondylitis) is a form of tendonitis. It occurs when the forearm muscles are used again and again in a twisting motion. Pain from tennis elbow occurs mainly on the outside of the elbow. But the pain can spread into the forearm and wrist. Your elbow may also be swollen and tender to the touch.  The pain may get worse when you move your arm or do simple activities. Bending your wrist back, shaking hands, or turning a doorknob may cause pain. The pain often gets worse after several weeks or months. Sometimes you may feel pain when your arm is still.  Tennis players who use a backhand stroke with poor technique are more likely to get tennis elbow. But playing tennis is only one cause of tennis elbow. Other common activities that can cause it include:    Hammering    Painting    Raking  Besides tennis players, people at risk include , gardeners, musicians, and dentists. Sometimes people get tennis elbow without doing anything that would cause the injury.  Treatment includes resting the arm and taking anti-inflammatory medicines. Special splints can help ease symptoms. Symptoms should get better after 4 to 6 weeks of rest. You may need steroid injections if resting and using a splint don t help. After the pain is relieved, you should change your activities so the symptoms don t return. You may need physical therapy. It may include stretching, range-of-motion, and strengthening exercises. These treatments help most cases. You may need surgery if your symptoms continue for 6 months despite treatment.  Home care  Follow these guidelines when caring for  yourself at home:    Rest your elbow as needed. Protect it from movement that causes pain. You may be told to use a forearm splint at night to ease symptoms in the morning. Your healthcare provider may recommend a special wrap or splint to compress the muscles of the forearm. This can ease pain during daytime activities. As your symptoms get better, start to move your elbow more.    Put an ice pack on the injured area. Do this for 20 minutes every 1 to 2 hours the first day for pain relief. You can make an ice pack by wrapping a plastic bag of ice cubes in a thin towel. Continue using the ice pack 3 to 4 times a day for the next several days. Then use the ice pack as needed to ease pain and swelling.    You may use acetaminophen or ibuprofen to control pain, unless another pain medicine was prescribed. If you have chronic liver or kidney disease, talk with your healthcare provider before using these medicines. Also talk with your provider if you ve had a stomach ulcer or gastrointestinal bleeding.    After your elbow heals, avoid the motion that caused your pain. Or learn to move in a way that causes less stress on the tendon. Using a forearm wrap may keep tennis elbow from happening again.    A tennis elbow strap may ease pain and keep you from further injury when you start playing tennis again. You can also lower your risk for injury by warming up before you play and cooling down afterward. You should also use the right equipment. For instance, make sure your racquet has the right  and is the right size for you.  Follow-up care  Follow up with your healthcare provider, or as advised, if your symptoms don t get better after 2 to 3 weeks of treatment.  When to seek medical advice  Call your healthcare provider right away if any of these occur:    Redness over the painful area    Pain, stiffness,  or swelling at the elbow gets worse    Any numbness or tingling in your arm, hands, or fingers    Unexplained fever  over 100.4 F (38 C)   Date Last Reviewed: 5/1/2017 2000-2017 [employer]. [address] [facility], [place] [address]. All rights reserved. This information is not intended as a substitute for professional medical care. Always follow your healthcare professional's instructions.

## 2019-03-08 ENCOUNTER — TELEPHONE (OUTPATIENT)
Dept: FAMILY MEDICINE | Facility: OTHER | Age: 44
End: 2019-03-08

## 2019-03-08 NOTE — TELEPHONE ENCOUNTER
Panel Management Review      Patient has the following on her problem list:     Hypertension   Last three blood pressure readings:  BP Readings from Last 3 Encounters:   06/20/18 142/82   02/08/18 130/80   01/27/18 123/69     Blood pressure: FAILED    HTN Guidelines:  Age 18-59 BP range:  Less than 140/90  Age 60-85 with Diabetes:  Less than 140/90  Age 60-85 without Diabetes:  less than 150/90      Composite cancer screening  Chart review shows that this patient is due/due soon for the following None  Summary:    Patient is due/failing the following:   BP CHECK    Action needed:   Patient needs nurse only appointment.    Type of outreach:    Phone, spoke to patient.  Appointment has been scheudled.      Questions for provider review:    None                                                                                                                                    Trinidad Willoughby CMA      Chart routed to Care Team .

## 2019-03-12 ENCOUNTER — ALLIED HEALTH/NURSE VISIT (OUTPATIENT)
Dept: FAMILY MEDICINE | Facility: OTHER | Age: 44
End: 2019-03-12
Payer: COMMERCIAL

## 2019-03-12 VITALS — SYSTOLIC BLOOD PRESSURE: 138 MMHG | HEART RATE: 98 BPM | DIASTOLIC BLOOD PRESSURE: 82 MMHG

## 2019-03-12 DIAGNOSIS — Z01.30 BLOOD PRESSURE CHECK: Primary | ICD-10-CM

## 2019-03-12 PROCEDURE — 99207 ZZC NO CHARGE NURSE ONLY: CPT

## 2019-03-12 NOTE — PROGRESS NOTES
Taylor Rey is a 44 year old patient who comes in today for a Blood Pressure check.  Initial BP:  /82 (BP Location: Right arm, Patient Position: Sitting, Cuff Size: Adult Large)   Pulse 98      98  Disposition: follow-up as previously indicated by provider and results routed to provider.     Patient denies any symptoms just doing a follow up BP.   Carolina Carranza MA

## 2019-04-05 ENCOUNTER — OFFICE VISIT (OUTPATIENT)
Dept: FAMILY MEDICINE | Facility: OTHER | Age: 44
End: 2019-04-05
Payer: COMMERCIAL

## 2019-04-05 VITALS
HEIGHT: 62 IN | SYSTOLIC BLOOD PRESSURE: 114 MMHG | DIASTOLIC BLOOD PRESSURE: 72 MMHG | BODY MASS INDEX: 44.15 KG/M2 | TEMPERATURE: 98 F | HEART RATE: 108 BPM | WEIGHT: 239.9 LBS | RESPIRATION RATE: 16 BRPM

## 2019-04-05 DIAGNOSIS — R10.13 EPIGASTRIC PAIN: Primary | ICD-10-CM

## 2019-04-05 LAB
ALBUMIN SERPL-MCNC: 3.7 G/DL (ref 3.4–5)
ALP SERPL-CCNC: 63 U/L (ref 40–150)
ALT SERPL W P-5'-P-CCNC: 44 U/L (ref 0–50)
ANION GAP SERPL CALCULATED.3IONS-SCNC: 5 MMOL/L (ref 3–14)
AST SERPL W P-5'-P-CCNC: 13 U/L (ref 0–45)
BILIRUB SERPL-MCNC: 0.5 MG/DL (ref 0.2–1.3)
BUN SERPL-MCNC: 15 MG/DL (ref 7–30)
CALCIUM SERPL-MCNC: 8.6 MG/DL (ref 8.5–10.1)
CHLORIDE SERPL-SCNC: 105 MMOL/L (ref 94–109)
CO2 SERPL-SCNC: 30 MMOL/L (ref 20–32)
CREAT SERPL-MCNC: 0.74 MG/DL (ref 0.52–1.04)
ERYTHROCYTE [DISTWIDTH] IN BLOOD BY AUTOMATED COUNT: 13.4 % (ref 10–15)
GFR SERPL CREATININE-BSD FRML MDRD: >90 ML/MIN/{1.73_M2}
GLUCOSE SERPL-MCNC: 78 MG/DL (ref 70–99)
HCT VFR BLD AUTO: 45.6 % (ref 35–47)
HGB BLD-MCNC: 14.5 G/DL (ref 11.7–15.7)
LIPASE SERPL-CCNC: 122 U/L (ref 73–393)
MCH RBC QN AUTO: 27.7 PG (ref 26.5–33)
MCHC RBC AUTO-ENTMCNC: 31.8 G/DL (ref 31.5–36.5)
MCV RBC AUTO: 87 FL (ref 78–100)
PLATELET # BLD AUTO: 262 10E9/L (ref 150–450)
POTASSIUM SERPL-SCNC: 4.2 MMOL/L (ref 3.4–5.3)
PROT SERPL-MCNC: 7 G/DL (ref 6.8–8.8)
RBC # BLD AUTO: 5.24 10E12/L (ref 3.8–5.2)
SODIUM SERPL-SCNC: 140 MMOL/L (ref 133–144)
WBC # BLD AUTO: 7.5 10E9/L (ref 4–11)

## 2019-04-05 PROCEDURE — 80053 COMPREHEN METABOLIC PANEL: CPT | Performed by: NURSE PRACTITIONER

## 2019-04-05 PROCEDURE — 85027 COMPLETE CBC AUTOMATED: CPT | Performed by: NURSE PRACTITIONER

## 2019-04-05 PROCEDURE — 99214 OFFICE O/P EST MOD 30 MIN: CPT | Performed by: NURSE PRACTITIONER

## 2019-04-05 PROCEDURE — 36415 COLL VENOUS BLD VENIPUNCTURE: CPT | Performed by: NURSE PRACTITIONER

## 2019-04-05 PROCEDURE — 83690 ASSAY OF LIPASE: CPT | Performed by: NURSE PRACTITIONER

## 2019-04-05 RX ORDER — OMEPRAZOLE 40 MG/1
40 CAPSULE, DELAYED RELEASE ORAL DAILY
Qty: 30 CAPSULE | Refills: 1 | Status: SHIPPED | OUTPATIENT
Start: 2019-04-05 | End: 2019-07-11

## 2019-04-05 ASSESSMENT — MIFFLIN-ST. JEOR: SCORE: 1693.49

## 2019-04-05 ASSESSMENT — PAIN SCALES - GENERAL: PAINLEVEL: MILD PAIN (2)

## 2019-04-05 NOTE — PROGRESS NOTES
SUBJECTIVE:   Taylor Rey is a 44 year old female who presents to clinic today for the following health issues:      ABDOMINAL   PAIN     Onset: 6 days    Description:   Character: Dull ache and Cramping  Location: epigastric region  Radiation: None    Intensity: 2/10    Progression of Symptoms:  improving    Accompanying Signs & Symptoms:  Fever/Chills?: no   Gas/Bloating: no   Nausea: YES  Vomitting: no   Diarrhea?: YES  Constipation:no   Dysuria or Hematuria: no    History:   Trauma: no   Previous similar pain: no    Previous tests done: none    Precipitating factors:   Does the pain change with:     Food: YES- [employer] pizza on Saturday     BM: no     Urination: no     Alleviating factors:  none    Therapies Tried and outcome: ibuprofen     LMP:  not applicable       She has had intermittent episodes of epigastric pain for years.  This typically starts after eating a greasy or fatty meal and resolves within 12 hours.  She did have her gallbladder removed in  due to this, but continued to have symptoms.  This episode has lasted 6 days so far.  Started about 6-8 hours after eating pizza for dinner  6 days ago.  Woke up at 0400 the next morning with severe epigastric pain.  She vomited.  Pain has improved since then, but is still present.  Rates it about a 2/10 now.  No further vomiting.  Feel nauseated.    No fevers/chills  No diarrhea, constipation or stool changes.   No blood in her stools or emesis    She drinks alcohol rarely and does not smoke.   Drinks caffeine daily and has a moderate amount of stress right now.   Takes NSAIDS about 4-5 times weekly for joint pains.      Problem list and histories reviewed & adjusted, as indicated.  Additional history: as documented    Patient Active Problem List   Diagnosis     Hypertension goal BP (blood pressure) < 140/90     Morbid obesity with BMI of 45.0-49.9, adult (H)     Past Surgical History:   Procedure Laterality Date     C  DELIVERY ONLY   "2/10/2004    , Low Cervical     C FULL ROUT OBSTE CARE, DELIV      pt. had toxemia/fetal distress related to amniotic fluid     C LIGATION,FALLOPIAN TUBE W/  2/10/2004     CHOLECYSTECTOMY, LAPOROSCOPIC  2006    Cholecystectomy, Laparoscopic     HC REMOVE TONSILS/ADENOIDS,12+ Y/O         Social History     Tobacco Use     Smoking status: Former Smoker     Last attempt to quit: 2005     Years since quittin.9     Smokeless tobacco: Never Used     Tobacco comment: one pack a month   Substance Use Topics     Alcohol use: Yes     Alcohol/week: 0.0 oz     Comment: very rare     Family History   Problem Relation Age of Onset     Cerebrovascular Disease Mother          from stroke/clotting \"vit. C deficiency\"     Arthritis Father      Heart Disease Father      Cerebrovascular Disease Maternal Grandmother              \"     Diabetes Paternal Grandmother      Breast Cancer Paternal Grandmother      Arthritis Paternal Grandmother          Current Outpatient Medications   Medication Sig Dispense Refill     losartan (COZAAR) 25 MG tablet Take 1 tablet (25 mg) by mouth daily 90 tablet 3     omeprazole (PRILOSEC) 40 MG DR capsule Take 1 capsule (40 mg) by mouth daily 30 capsule 1     Allergies   Allergen Reactions     Lisinopril Cough     Sulfa Drugs      Azo [Phenazopyridine] Rash     BP Readings from Last 3 Encounters:   19 114/72   19 138/82   18 142/82    Wt Readings from Last 3 Encounters:   19 108.8 kg (239 lb 14.4 oz)   18 111.1 kg (245 lb)   18 113.4 kg (250 lb)                    Reviewed and updated as needed this visit by clinical staff  Tobacco  Allergies  Meds  Med Hx  Surg Hx  Fam Hx  Soc Hx      Reviewed and updated as needed this visit by Provider         ROS:  Constitutional, HEENT, cardiovascular, pulmonary, gi and gu systems are negative, except as otherwise noted.    OBJECTIVE:     /72 (BP Location: Left arm, Patient " "Position: Sitting, Cuff Size: Adult Large)   Pulse 108   Temp 98  F (36.7  C) (Temporal)   Resp 16   Ht 1.562 m (5' 1.5\")   Wt 108.8 kg (239 lb 14.4 oz)   LMP  (LMP Unknown)   BMI 44.59 kg/m    Body mass index is 44.59 kg/m .  GENERAL: alert, no distress and obese  NECK: no adenopathy, no asymmetry, masses, or scars and thyroid normal to palpation  RESP: lungs clear to auscultation - no rales, rhonchi or wheezes  CV: regular rate and rhythm, normal S1 S2, no S3 or S4, no murmur, click or rub, no peripheral edema and peripheral pulses strong  ABDOMEN: soft, bowel sounds normal, epigastric tenderness to palpation.  No other areas of tenderness.   MS: no gross musculoskeletal defects noted, no edema    Diagnostic Test Results:  Pending     ASSESSMENT/PLAN:         1. Epigastric pain  Likely gastritis.  Will start Omeprazole and check a few labs.  Advised on avoiding caffeine, NSAIDS and spicy foods. If this fails to improve, will need to consider endoscopy for further evaluation.     - omeprazole (PRILOSEC) 40 MG DR capsule; Take 1 capsule (40 mg) by mouth daily  Dispense: 30 capsule; Refill: 1  - Comprehensive metabolic panel (BMP + Alb, Alk Phos, ALT, AST, Total. Bili, TP)  - CBC with platelets  - Lipase  - H Pylori antigen, stool; Future    See Patient Instructions    PAOLA Moe [facility]    "

## 2019-04-05 NOTE — PATIENT INSTRUCTIONS
Labs will be done today.   For normal results, you will receive a letter with the results in about 2 weeks.  If anything is abnormal or unexpected, someone from the clinic will call you.      Start Omeprazole once a day on an empty stomach.

## 2019-04-08 ENCOUNTER — TELEPHONE (OUTPATIENT)
Dept: FAMILY MEDICINE | Facility: OTHER | Age: 44
End: 2019-04-08

## 2019-04-08 DIAGNOSIS — R10.13 EPIGASTRIC PAIN: ICD-10-CM

## 2019-04-08 PROCEDURE — 87338 HPYLORI STOOL AG IA: CPT | Performed by: NURSE PRACTITIONER

## 2019-04-08 NOTE — LETTER
April 9, 2019      Taylor DICKEY Candido  [address] [facility] [id number]        Dear MsAngel,    We are writing to inform you of your test results.    Your results were all normal or expected.  Please continue your current plan of care.     Resulted Orders   H Pylori antigen, stool   Result Value Ref Range    Specimen Description Feces     H Pylori Antigen       Negative for Helicobacter pylori antigen by enzyme immunoassay. A negative result   indicates the absence of H. pylori antigen or that the level of antigen is below the level   of detection.         If you have any questions or concerns, please call the clinic at the number listed above.       Sincerely,        [facility]

## 2019-04-09 LAB
H PYLORI AG STL QL IA: NORMAL
SPECIMEN SOURCE: NORMAL

## 2019-04-09 NOTE — RESULT ENCOUNTER NOTE
Please notify patient, call or letter    Your results were all normal or expected.  Please continue your current plan of care.     Electronically signed by Ramya Dejesus CNP.

## 2019-07-11 ENCOUNTER — OFFICE VISIT (OUTPATIENT)
Dept: FAMILY MEDICINE | Facility: OTHER | Age: 44
End: 2019-07-11
Payer: COMMERCIAL

## 2019-07-11 VITALS
DIASTOLIC BLOOD PRESSURE: 80 MMHG | SYSTOLIC BLOOD PRESSURE: 130 MMHG | HEART RATE: 91 BPM | OXYGEN SATURATION: 98 % | BODY MASS INDEX: 44.24 KG/M2 | RESPIRATION RATE: 20 BRPM | WEIGHT: 238 LBS | TEMPERATURE: 97.6 F

## 2019-07-11 DIAGNOSIS — I10 BENIGN ESSENTIAL HYPERTENSION: Primary | ICD-10-CM

## 2019-07-11 DIAGNOSIS — E66.01 MORBID OBESITY WITH BMI OF 45.0-49.9, ADULT (H): ICD-10-CM

## 2019-07-11 PROCEDURE — 99213 OFFICE O/P EST LOW 20 MIN: CPT | Performed by: NURSE PRACTITIONER

## 2019-07-11 RX ORDER — LOSARTAN POTASSIUM 25 MG/1
25 TABLET ORAL DAILY
Qty: 90 TABLET | Refills: 3 | Status: SHIPPED | OUTPATIENT
Start: 2019-07-11 | End: 2020-07-13

## 2019-07-11 ASSESSMENT — PAIN SCALES - GENERAL: PAINLEVEL: NO PAIN (0)

## 2019-07-11 NOTE — PROGRESS NOTES
"Subjective     Taylor Rey is a 44 year old female who presents to clinic today for the following health issues:    HPI   Hypertension Follow-up      Do you check your blood pressure regularly outside of the clinic? No     Are you following a low salt diet? Yes    Are your blood pressures ever more than 140 on the top number (systolic) OR more   than 90 on the bottom number (diastolic), for example 140/90? No             Review of Systems   ROS COMP: Constitutional, HEENT, cardiovascular, pulmonary, gi and gu systems are negative, except as otherwise noted.      Objective    /80   Pulse 91   Temp 97.6  F (36.4  C) (Temporal)   Resp 20   Wt 108 kg (238 lb)   LMP 07/06/2019 (Exact Date)   SpO2 98%   Breastfeeding? No   BMI 44.24 kg/m    Body mass index is 44.24 kg/m .  Physical Exam   GENERAL: alert, no distress and obese  NECK: no adenopathy, no asymmetry, masses, or scars and thyroid normal to palpation  RESP: lungs clear to auscultation - no rales, rhonchi or wheezes  CV: regular rate and rhythm, normal S1 S2, no S3 or S4, no murmur, click or rub  ABDOMEN: soft, nontender, no hepatosplenomegaly, no masses and bowel sounds normal  MS: no gross musculoskeletal defects noted, no edema    Diagnostic Test Results:  none         Assessment & Plan     1. Benign essential hypertension  Chronic, controlled.  No change in treatment plan.   - losartan (COZAAR) 25 MG tablet; Take 1 tablet (25 mg) by mouth daily  Dispense: 90 tablet; Refill: 3    2. Morbid obesity with BMI of 45.0-49.9, adult (H)       BMI:   Estimated body mass index is 44.24 kg/m  as calculated from the following:    Height as of 4/5/19: 1.562 m (5' 1.5\").    Weight as of this encounter: 108 kg (238 lb).   Weight management plan: Discussed healthy diet and exercise guidelines        See Patient Instructions    Return in about 1 year (around 7/11/2020) for Physical.    PAOLA Moe [facility]"

## 2019-08-20 DIAGNOSIS — I10 BENIGN ESSENTIAL HYPERTENSION: ICD-10-CM

## 2019-08-20 RX ORDER — LOSARTAN POTASSIUM 25 MG/1
TABLET ORAL
Qty: 30 TABLET | Refills: 0 | OUTPATIENT
Start: 2019-08-20

## 2019-08-20 NOTE — TELEPHONE ENCOUNTER
"Requested Prescriptions   Pending Prescriptions Disp Refills     losartan (COZAAR) 25 MG tablet [Pharmacy Med Name: LOSARTAN POTASSIUM 25MG TABS] 30 tablet 0     Sig: TAKE ONE TABLET BY MOUTH ONCE DAILY -- (NEED TO BE SEEN IN CLINIC FOR FURTHER REFILLS)   Last Written Prescription Date:  7/11/19  Last Fill Quantity: 90,  # refills: 3   Last office visit: 7/11/2019 with prescribing provider:  7/11/19   Future Office Visit:        Angiotensin-II Receptors Passed - 8/20/2019 10:56 AM        Passed - Last blood pressure under 140/90 in past 12 months     BP Readings from Last 3 Encounters:   07/11/19 130/80   04/05/19 114/72   03/12/19 138/82                 Passed - Recent (12 mo) or future (30 days) visit within the authorizing provider's specialty     Patient had office visit in the last 12 months or has a visit in the next 30 days with authorizing provider or within the authorizing provider's specialty.  See \"Patient Info\" tab in inbasket, or \"Choose Columns\" in Meds & Orders section of the refill encounter.              Passed - Medication is active on med list        Passed - Patient is age 18 or older        Passed - No active pregnancy on record        Passed - Normal serum creatinine on file in past 12 months     Recent Labs   Lab Test 04/05/19  1040   CR 0.74             Passed - Normal serum potassium on file in past 12 months     Recent Labs   Lab Test 04/05/19  1040   POTASSIUM 4.2                    Passed - No positive pregnancy test in past 12 months        "

## 2019-08-20 NOTE — TELEPHONE ENCOUNTER
Prescription was sent 7/11/19 for #90 with 3 refills.  Pharmacy notified via E-Prescribe refusal.     Lee Ann Levy RN  [facility]

## 2020-07-12 DIAGNOSIS — I10 BENIGN ESSENTIAL HYPERTENSION: ICD-10-CM

## 2020-07-13 NOTE — TELEPHONE ENCOUNTER
Routing to schedulers to set up face to face appointment for patient for physical.  Please also ask patient how much medication she has left and schedule appropriately.   If patient needs a refill before their appointment, please route to PCP for completion of refill.  Upon routing message, write WHEN appointment is scheduled and if they have enough medication to last to appointment.  Thank you!

## 2020-07-15 RX ORDER — LOSARTAN POTASSIUM 25 MG/1
TABLET ORAL
Qty: 30 TABLET | Refills: 0 | Status: SHIPPED | OUTPATIENT
Start: 2020-07-15 | End: 2020-07-28

## 2020-07-28 ENCOUNTER — OFFICE VISIT (OUTPATIENT)
Dept: FAMILY MEDICINE | Facility: CLINIC | Age: 45
End: 2020-07-28
Payer: COMMERCIAL

## 2020-07-28 VITALS
WEIGHT: 241 LBS | SYSTOLIC BLOOD PRESSURE: 121 MMHG | HEART RATE: 84 BPM | TEMPERATURE: 97.5 F | DIASTOLIC BLOOD PRESSURE: 81 MMHG | OXYGEN SATURATION: 100 % | BODY MASS INDEX: 44.35 KG/M2 | RESPIRATION RATE: 20 BRPM | HEIGHT: 62 IN

## 2020-07-28 DIAGNOSIS — Z00.00 ROUTINE GENERAL MEDICAL EXAMINATION AT A HEALTH CARE FACILITY: Primary | ICD-10-CM

## 2020-07-28 DIAGNOSIS — I10 BENIGN ESSENTIAL HYPERTENSION: ICD-10-CM

## 2020-07-28 DIAGNOSIS — Z13.6 CARDIOVASCULAR SCREENING; LDL GOAL LESS THAN 160: ICD-10-CM

## 2020-07-28 DIAGNOSIS — Z12.31 ENCOUNTER FOR SCREENING MAMMOGRAM FOR BREAST CANCER: ICD-10-CM

## 2020-07-28 PROCEDURE — 99396 PREV VISIT EST AGE 40-64: CPT | Performed by: NURSE PRACTITIONER

## 2020-07-28 RX ORDER — LOSARTAN POTASSIUM 25 MG/1
TABLET ORAL
Qty: 30 TABLET | Refills: 11 | Status: SHIPPED | OUTPATIENT
Start: 2020-07-28

## 2020-07-28 ASSESSMENT — MIFFLIN-ST. JEOR: SCORE: 1701.42

## 2020-07-28 ASSESSMENT — PAIN SCALES - GENERAL: PAINLEVEL: MILD PAIN (2)

## 2020-07-28 NOTE — PATIENT INSTRUCTIONS
Schedule fasting labs.     Naproxen twice a day with food should help inflammation.     Schedule a mammogram    Patient Education     Plantar Fasciitis  Plantar fasciitis is a painful swelling of the plantar fascia. The plantar fascia is a thick, fibrous layer of tissue that covers the bones on the bottom of your foot. It supports the foot bones in an arched position.  Plantar fasciitis can happen gradually or suddenly. It usually affects one foot at a time. Heel pain can be sharp, like a knife sticking into the bottom of your foot. You may feel pain after exercising, long-distance jogging, stair climbing, long periods of standing, or after standing up.  Risk factors include: non-active lifestyle, arthritis, diabetes, obesity or recent weight gain, flat foot, high arch. Wearing high heels, loose shoes, or shoes with poor arch support for long periods of time adds to the risk. This problem is commonly found in runners and dancers. It also found in people who stand on hard surfaces for long periods of time.  Foot pain from this condition is usually worse in the morning. But it often improves with walking. By the end of the day there may be a dull aching. Treatment requires short-term rest and controlling swelling. It may take up to 9 months before all symptoms go away. Rarely, a steroid injection into the foot, or surgery, may be needed.  Home care    If you are overweight, lose weight to help healing.    Choose supportive shoes with good arch support and shock absorbency. Replace athletic shoes when they become worn out. Don t walk or run barefoot.    Premade or custom-fitted shoe inserts may be helpful. Inserts made of silicone seem to be the most effective. Custom-made inserts can be provided by foot specialist, physical therapist, or orthopedist.    Premade or custom-made night splints keep the heel stretched out while you sleep. They may prevent morning pain.    Limit activities that stress the feet: jogging,  prolonged standing or walking, contact sports, etc.    First thing in the morning and before sports, stretch the bottom of your feet. Gently flex your ankle so the toes move toward your knee.    Icing may help control heel pain. Apply an ice pack to the heel for 10 to 20 minutes as a preventive. Or ice your heel after a severe flare-up of symptoms. You may repeat this every 1 to 2 hours as needed.    You may use over-the-counter pain medicine to control pain, unless another medicine was prescribed. Anti-inflammatory pain medicines, such as ibuprofen or naproxen, may work better than acetaminophen. If you have chronic liver or kidney disease or ever had a stomach ulcer or gastrointestinal bleeding, talk with your healthcare provider before using these medicines.  Follow-up care  Follow up with your healthcare provider, or as advised.  Call for an appointment if pain worsens or there is no relief after a few weeks of home treatment. Shoe inserts, a night splint, or a special boot may be required.  If X-rays were taken, you will be told of any new findings that may affect your care.  When to seek medical advice  Call your healthcare provider right away if any of these occur:    Foot swelling    Redness or warmth with increasing pain  Date Last Reviewed: 5/1/2018 2000-2019 [employer]. [address] [place] [address], [place] [address]. All rights reserved. This information is not intended as a substitute for professional medical care. Always follow your healthcare professional's instructions.               Preventive Health Recommendations  Female Ages 40 to 49    Yearly exam:     See your health care provider every year in order to  1. Review health changes.   2. Discuss preventive care.    3. Review your medicines if your doctor prescribed any.      Get a Pap test every three years (unless you have an abnormal result and your provider advises testing more often).      If you get Pap tests with HPV test, you  only need to test every 5 years, unless you have an abnormal result. You do not need a Pap test if your uterus was removed (hysterectomy) and you have not had cancer.      You should be tested each year for STDs (sexually transmitted diseases), if you're at risk.     Ask your doctor if you should have a mammogram.      Have a colonoscopy (test for colon cancer) if someone in your family has had colon cancer or polyps before age 50.       Have a cholesterol test every 5 years.       Have a diabetes test (fasting glucose) after age 45. If you are at risk for diabetes, you should have this test every 3 years.    Shots: Get a flu shot each year. Get a tetanus shot every 10 years.     Nutrition:     Eat at least 5 servings of fruits and vegetables each day.    Eat whole-grain bread, whole-wheat pasta and brown rice instead of white grains and rice.    Get adequate Calcium and Vitamin D.      Lifestyle    Exercise at least 150 minutes a week (an average of 30 minutes a day, 5 days a week). This will help you control your weight and prevent disease.    Limit alcohol to one drink per day.    No smoking.     Wear sunscreen to prevent skin cancer.    See your dentist every six months for an exam and cleaning.

## 2020-07-28 NOTE — PROGRESS NOTES
SUBJECTIVE:   CC: Taylor Rey is an 45 year old woman who presents for preventive health visit.     Healthy Habits:    Do you get at least three servings of calcium containing foods daily (dairy, green leafy vegetables, etc.)? yes    Amount of exercise or daily activities, outside of work: none    Problems taking medications regularly No    Medication side effects: No    Have you had an eye exam in the past two years? yes    Do you see a dentist twice per year? no    Do you have sleep apnea, excessive snoring or daytime drowsiness?yes, undiagnosed    Needs her Losartan refilled.  No concerns.       Today's PHQ-2 Score:   PHQ-2 ( 1999 [employer]) 7/28/2020 6/20/2018   Q1: Little interest or pleasure in doing things 0 0   Q2: Feeling down, depressed or hopeless 0 0   PHQ-2 Score 0 0       Abuse: Current or Past(Physical, Sexual or Emotional)- No  Do you feel safe in your environment? Yes        Social History     Tobacco Use     Smoking status: Former Smoker     Last attempt to quit: 5/5/2005     Years since quitting: 15.2     Smokeless tobacco: Never Used     Tobacco comment: one pack a month   Substance Use Topics     Alcohol use: Yes     Alcohol/week: 0.0 standard drinks     Comment: very rare     If you drink alcohol do you typically have >3 drinks per day or >7 drinks per week? No                     Reviewed orders with patient.  Reviewed health maintenance and updated orders accordingly - Yes      Mammogram Screening: Patient under age 50, mutual decision reflected in health maintenance.      Pertinent mammograms are reviewed under the imaging tab.  History of abnormal Pap smear: NO - age 30-65 PAP every 5 years with negative HPV co-testing recommended  PAP / HPV Latest Ref Rng & Units 7/18/2016   PAP - NIL   HPV 16 DNA NEG Negative   HPV 18 DNA NEG Negative   OTHER HR HPV NEG Negative     Reviewed and updated as needed this visit by clinical staff  Tobacco  Allergies  Meds         Reviewed and updated as  "needed this visit by Provider            ROS:  CONSTITUTIONAL: NEGATIVE for fever, chills, change in weight  INTEGUMENTARU/SKIN: NEGATIVE for worrisome rashes, moles or lesions  EYES: NEGATIVE for vision changes or irritation  ENT: NEGATIVE for ear, mouth and throat problems  RESP: NEGATIVE for significant cough or SOB  BREAST: NEGATIVE for masses, tenderness or discharge  CV: NEGATIVE for chest pain, palpitations or peripheral edema  GI: NEGATIVE for nausea, abdominal pain, heartburn, or change in bowel habits  : NEGATIVE for unusual urinary or vaginal symptoms. Periods are regular.  MUSCULOSKELETAL: NEGATIVE for significant arthralgias or myalgia  NEURO: NEGATIVE for weakness, dizziness or paresthesias  PSYCHIATRIC: NEGATIVE for changes in mood or affect    OBJECTIVE:   /81   Pulse 84   Temp 97.5  F (36.4  C) (Temporal)   Resp 20   Ht 1.575 m (5' 2\")   Wt 109.3 kg (241 lb)   LMP 07/14/2020 (Approximate)   SpO2 100%   BMI 44.08 kg/m    EXAM:  GENERAL: alert, no distress and obese  EYES: Eyes grossly normal to inspection, PERRL and conjunctivae and sclerae normal  HENT: ear canals and TM's normal, nose and mouth without ulcers or lesions  NECK: no adenopathy, no asymmetry, masses, or scars and thyroid normal to palpation  RESP: lungs clear to auscultation - no rales, rhonchi or wheezes  CV: regular rate and rhythm, normal S1 S2, no S3 or S4, no murmur, click or rub, no peripheral edema and peripheral pulses strong  ABDOMEN: soft, nontender, no hepatosplenomegaly, no masses and bowel sounds normal  MS: no gross musculoskeletal defects noted, no edema  SKIN: no suspicious lesions or rashes  NEURO: Normal strength and tone, mentation intact and speech normal  PSYCH: mentation appears normal, affect normal/bright    Diagnostic Test Results:  Pending     ASSESSMENT/PLAN:   1. Routine general medical examination at a health care facility    2. Benign essential hypertension  Chronic, controlled.  No change " "in treatment plan.   - losartan (COZAAR) 25 MG tablet; TAKE ONE TABLET (25MG) BY MOUTH ONCE DAILY  Dispense: 30 tablet; Refill: 11    3. CARDIOVASCULAR SCREENING; LDL GOAL LESS THAN 160  - Lipid panel reflex to direct LDL Fasting; Future    4. Encounter for screening mammogram for breast cancer  - *MA Screening Digital Bilateral; Future    COUNSELING:   Reviewed preventive health counseling, as reflected in patient instructions    Estimated body mass index is 44.08 kg/m  as calculated from the following:    Height as of this encounter: 1.575 m (5' 2\").    Weight as of this encounter: 109.3 kg (241 lb).    Weight management plan: Discussed healthy diet and exercise guidelines     reports that she quit smoking about 15 years ago. She has never used smokeless tobacco.      Counseling Resources:  ATP IV Guidelines  Pooled Cohorts Equation Calculator  Breast Cancer Risk Calculator  FRAX Risk Assessment  ICSI Preventive Guidelines  Dietary Guidelines for Americans, 2010  USDA's MyPlate  ASA Prophylaxis  Lung CA Screening    PAOLA Moe CNP  [facility]  "

## 2025-02-10 NOTE — TELEPHONE ENCOUNTER
----- Message from PAOLA Moe CNP sent at 4/8/2019  1:42 PM CDT -----  Please call and let her know all her labs were normal.  Just waiting on the tool test.     Electronically signed by Ramya Dejesus CNP.    
Called and LM for patient to return call. Please relay message to patient.     Carolina Carranza MA   
Spoke with patient and informed of message below. Patient understood.     Carolina Carranza MA   
I have reviewed and agree with the initial nursing note, except as documented in my note.